# Patient Record
Sex: MALE | Race: WHITE | Employment: OTHER | URBAN - METROPOLITAN AREA
[De-identification: names, ages, dates, MRNs, and addresses within clinical notes are randomized per-mention and may not be internally consistent; named-entity substitution may affect disease eponyms.]

---

## 2017-01-31 ENCOUNTER — TELEPHONE (OUTPATIENT)
Dept: CARDIOLOGY CLINIC | Age: 52
End: 2017-01-31

## 2017-02-02 ENCOUNTER — TELEPHONE (OUTPATIENT)
Dept: CARDIOLOGY CLINIC | Age: 52
End: 2017-02-02

## 2017-02-09 ENCOUNTER — TELEPHONE (OUTPATIENT)
Dept: CARDIOLOGY CLINIC | Age: 52
End: 2017-02-09

## 2017-02-13 ENCOUNTER — TELEPHONE (OUTPATIENT)
Dept: CARDIOLOGY CLINIC | Age: 52
End: 2017-02-13

## 2017-02-24 ENCOUNTER — OFFICE VISIT (OUTPATIENT)
Dept: CARDIOLOGY CLINIC | Age: 52
End: 2017-02-24

## 2017-02-24 VITALS
HEART RATE: 87 BPM | HEIGHT: 67 IN | WEIGHT: 149.4 LBS | DIASTOLIC BLOOD PRESSURE: 80 MMHG | SYSTOLIC BLOOD PRESSURE: 106 MMHG | BODY MASS INDEX: 23.45 KG/M2

## 2017-02-24 DIAGNOSIS — I48.0 PAF (PAROXYSMAL ATRIAL FIBRILLATION) (HCC): Primary | ICD-10-CM

## 2017-02-24 PROCEDURE — 99214 OFFICE O/P EST MOD 30 MIN: CPT | Performed by: INTERNAL MEDICINE

## 2017-03-27 ENCOUNTER — TELEPHONE (OUTPATIENT)
Dept: CARDIOLOGY CLINIC | Age: 52
End: 2017-03-27

## 2017-03-28 ENCOUNTER — PROCEDURE VISIT (OUTPATIENT)
Dept: CARDIOLOGY CLINIC | Age: 52
End: 2017-03-28

## 2017-03-28 DIAGNOSIS — Z95.810 DUAL IMPLANTABLE CARDIOVERTER-DEFIBRILLATOR IN SITU: Primary | ICD-10-CM

## 2017-03-28 PROCEDURE — 93296 REM INTERROG EVL PM/IDS: CPT | Performed by: INTERNAL MEDICINE

## 2017-03-28 PROCEDURE — 93295 DEV INTERROG REMOTE 1/2/MLT: CPT | Performed by: INTERNAL MEDICINE

## 2017-03-28 PROCEDURE — 93297 REM INTERROG DEV EVAL ICPMS: CPT | Performed by: INTERNAL MEDICINE

## 2017-04-17 DIAGNOSIS — I25.5 ISCHEMIC CARDIOMYOPATHY: ICD-10-CM

## 2017-04-18 RX ORDER — FUROSEMIDE 20 MG/1
20 TABLET ORAL DAILY
Qty: 30 TABLET | Refills: 1 | Status: SHIPPED | OUTPATIENT
Start: 2017-04-18 | End: 2017-06-21 | Stop reason: SDUPTHER

## 2017-04-27 ENCOUNTER — OFFICE VISIT (OUTPATIENT)
Dept: CARDIOLOGY CLINIC | Age: 52
End: 2017-04-27

## 2017-04-27 VITALS
SYSTOLIC BLOOD PRESSURE: 120 MMHG | DIASTOLIC BLOOD PRESSURE: 68 MMHG | HEART RATE: 86 BPM | BODY MASS INDEX: 23.39 KG/M2 | HEIGHT: 67 IN | WEIGHT: 149 LBS

## 2017-04-27 DIAGNOSIS — I48.0 PAF (PAROXYSMAL ATRIAL FIBRILLATION) (HCC): Primary | ICD-10-CM

## 2017-04-27 PROCEDURE — 99214 OFFICE O/P EST MOD 30 MIN: CPT | Performed by: INTERNAL MEDICINE

## 2017-06-21 DIAGNOSIS — I25.5 ISCHEMIC CARDIOMYOPATHY: ICD-10-CM

## 2017-06-23 RX ORDER — FUROSEMIDE 20 MG/1
20 TABLET ORAL DAILY
Qty: 30 TABLET | Refills: 11 | Status: SHIPPED | OUTPATIENT
Start: 2017-06-23 | End: 2017-12-29 | Stop reason: SDUPTHER

## 2017-07-05 ENCOUNTER — PROCEDURE VISIT (OUTPATIENT)
Dept: CARDIOLOGY CLINIC | Age: 52
End: 2017-07-05

## 2017-07-05 DIAGNOSIS — Z95.810 ICD (IMPLANTABLE CARDIOVERTER-DEFIBRILLATOR), DUAL, IN SITU: Primary | ICD-10-CM

## 2017-07-05 PROCEDURE — 93297 REM INTERROG DEV EVAL ICPMS: CPT | Performed by: INTERNAL MEDICINE

## 2017-07-05 PROCEDURE — 93296 REM INTERROG EVL PM/IDS: CPT | Performed by: INTERNAL MEDICINE

## 2017-07-05 PROCEDURE — 93295 DEV INTERROG REMOTE 1/2/MLT: CPT | Performed by: INTERNAL MEDICINE

## 2017-07-25 ENCOUNTER — OFFICE VISIT (OUTPATIENT)
Dept: CARDIOLOGY CLINIC | Age: 52
End: 2017-07-25

## 2017-07-25 VITALS
BODY MASS INDEX: 23.57 KG/M2 | HEIGHT: 67 IN | DIASTOLIC BLOOD PRESSURE: 60 MMHG | SYSTOLIC BLOOD PRESSURE: 100 MMHG | WEIGHT: 150.2 LBS | HEART RATE: 73 BPM

## 2017-07-25 DIAGNOSIS — R06.02 SHORTNESS OF BREATH: Primary | ICD-10-CM

## 2017-07-25 DIAGNOSIS — Z92.29 H/O AMIODARONE THERAPY: ICD-10-CM

## 2017-07-25 PROCEDURE — 93000 ELECTROCARDIOGRAM COMPLETE: CPT | Performed by: INTERNAL MEDICINE

## 2017-07-25 PROCEDURE — 99213 OFFICE O/P EST LOW 20 MIN: CPT | Performed by: INTERNAL MEDICINE

## 2017-07-26 RX ORDER — ATORVASTATIN CALCIUM 80 MG/1
80 TABLET, FILM COATED ORAL DAILY
Qty: 30 TABLET | Refills: 11 | Status: SHIPPED | OUTPATIENT
Start: 2017-07-26 | End: 2020-08-11

## 2017-07-27 ENCOUNTER — PROCEDURE VISIT (OUTPATIENT)
Dept: CARDIOLOGY CLINIC | Age: 52
End: 2017-07-27

## 2017-07-27 DIAGNOSIS — R06.02 SHORTNESS OF BREATH: Primary | ICD-10-CM

## 2017-07-27 LAB
LV EF: 18 %
LVEF MODALITY: NORMAL

## 2017-07-27 PROCEDURE — 93306 TTE W/DOPPLER COMPLETE: CPT | Performed by: INTERNAL MEDICINE

## 2017-07-31 ENCOUNTER — TELEPHONE (OUTPATIENT)
Dept: CARDIOLOGY CLINIC | Age: 52
End: 2017-07-31

## 2017-08-28 ENCOUNTER — TELEPHONE (OUTPATIENT)
Dept: CARDIOLOGY CLINIC | Age: 52
End: 2017-08-28

## 2017-10-09 ENCOUNTER — PROCEDURE VISIT (OUTPATIENT)
Dept: CARDIOLOGY CLINIC | Age: 52
End: 2017-10-09

## 2017-10-09 DIAGNOSIS — Z95.810 ICD (IMPLANTABLE CARDIOVERTER-DEFIBRILLATOR), DUAL, IN SITU: Primary | ICD-10-CM

## 2017-10-09 PROCEDURE — 93297 REM INTERROG DEV EVAL ICPMS: CPT | Performed by: INTERNAL MEDICINE

## 2017-10-09 PROCEDURE — 93296 REM INTERROG EVL PM/IDS: CPT | Performed by: INTERNAL MEDICINE

## 2017-10-09 PROCEDURE — 93295 DEV INTERROG REMOTE 1/2/MLT: CPT | Performed by: INTERNAL MEDICINE

## 2017-10-23 ENCOUNTER — TELEPHONE (OUTPATIENT)
Dept: CARDIOLOGY CLINIC | Age: 52
End: 2017-10-23

## 2017-10-23 NOTE — TELEPHONE ENCOUNTER
Per Dr. Crow De Leon patient is fine to have dental procedure. 3 Steven Kebede left a message that patient is OK to have dental procedure.

## 2017-10-23 NOTE — TELEPHONE ENCOUNTER
[address] called and wants to know if ok to pull pt tooth, due to pt stated he has a stroke last week. Please advise.  Call to [employer].

## 2017-11-18 DIAGNOSIS — I25.5 ISCHEMIC CARDIOMYOPATHY: ICD-10-CM

## 2017-11-20 RX ORDER — CARVEDILOL 6.25 MG/1
6.25 TABLET ORAL 2 TIMES DAILY WITH MEALS
Qty: 180 TABLET | Refills: 3 | Status: SHIPPED | OUTPATIENT
Start: 2017-11-20 | End: 2019-01-10 | Stop reason: SDUPTHER

## 2017-12-18 ENCOUNTER — TELEPHONE (OUTPATIENT)
Dept: CARDIOLOGY CLINIC | Age: 52
End: 2017-12-18

## 2017-12-28 ENCOUNTER — TELEPHONE (OUTPATIENT)
Dept: CARDIOLOGY CLINIC | Age: 52
End: 2017-12-28

## 2017-12-29 ENCOUNTER — HOSPITAL ENCOUNTER (OUTPATIENT)
Dept: GENERAL RADIOLOGY | Age: 52
Discharge: HOME | End: 2017-12-29
Attending: INTERNAL MEDICINE | Admitting: INTERNAL MEDICINE

## 2017-12-29 ENCOUNTER — TELEPHONE (OUTPATIENT)
Dept: CARDIOLOGY CLINIC | Age: 52
End: 2017-12-29

## 2017-12-29 ENCOUNTER — OFFICE VISIT (OUTPATIENT)
Dept: CARDIOLOGY CLINIC | Age: 52
End: 2017-12-29

## 2017-12-29 VITALS
WEIGHT: 145.2 LBS | DIASTOLIC BLOOD PRESSURE: 80 MMHG | SYSTOLIC BLOOD PRESSURE: 124 MMHG | HEIGHT: 67 IN | HEART RATE: 78 BPM | BODY MASS INDEX: 22.79 KG/M2

## 2017-12-29 DIAGNOSIS — I47.20 SUSTAINED VT (VENTRICULAR TACHYCARDIA): Primary | ICD-10-CM

## 2017-12-29 DIAGNOSIS — I25.5 ISCHEMIC CARDIOMYOPATHY: ICD-10-CM

## 2017-12-29 LAB
ANION GAP SERPL CALCULATED.3IONS-SCNC: 11 MMOL/L (ref 4–16)
BUN BLDV-MCNC: 12 MG/DL (ref 6–23)
CALCIUM SERPL-MCNC: 9.5 MG/DL (ref 8.3–10.6)
CHLORIDE BLD-SCNC: 100 MMOL/L (ref 99–110)
CO2: 28 MMOL/L (ref 21–32)
CREAT SERPL-MCNC: 1.1 MG/DL (ref 0.9–1.3)
GFR AFRICAN AMERICAN: >60 ML/MIN/1.73M2
GFR NON-AFRICAN AMERICAN: >60 ML/MIN/1.73M2
GLUCOSE BLD-MCNC: 86 MG/DL (ref 70–99)
MAGNESIUM: 2.3 MG/DL (ref 1.8–2.4)
PHOSPHORUS: 3.2 MG/DL (ref 2.5–4.9)
POTASSIUM SERPL-SCNC: 4.5 MMOL/L (ref 3.5–5.1)
PRO-BNP: 664.8 PG/ML
SODIUM BLD-SCNC: 139 MMOL/L (ref 135–145)

## 2017-12-29 PROCEDURE — 99214 OFFICE O/P EST MOD 30 MIN: CPT | Performed by: INTERNAL MEDICINE

## 2017-12-29 RX ORDER — POTASSIUM CHLORIDE 750 MG/1
10 TABLET, EXTENDED RELEASE ORAL DAILY
Qty: 60 TABLET | Refills: 3 | Status: SHIPPED | OUTPATIENT
Start: 2017-12-29 | End: 2020-01-27

## 2017-12-29 RX ORDER — FUROSEMIDE 20 MG/1
20 TABLET ORAL 2 TIMES DAILY
Qty: 30 TABLET | Refills: 2 | Status: SHIPPED | OUTPATIENT
Start: 2017-12-29 | End: 2018-06-26 | Stop reason: SDUPTHER

## 2017-12-29 NOTE — PROGRESS NOTES
Electrophysiology FU Note          Chief complaint :  Recent shock    Referring physician:  Magnolia Londono      Primary care physician: Fara Schuster MD      History of Present Illness: This visit (12/29/2017)      Chief Complaint   Patient presents with    Atrial Fibrillation     Patient is here for Abn ICD check. Patient denies any chest pain,dizzines, edema, and palitations. Patient experiences sob with exertion. Previous visit: (2/24/2017)      Chief Complaint   Patient presents with    Atrial Fibrillation     Patient is here for Abn ICD check. Patient denies any chest pain,dizzines, edema, and palitations. Patient experiences sob with exertion. Previous visit:    Chief Complaint   Patient presents with    Cardiomyopathy     Pt is consult for Ischemic Cardiomyopathy from Dr Melyssa Goddard. Pt had a echo 09/22/16 and EF 20%. He had a stroke in march. He had MI in [facility] with stents. He has finished cardiac rehab. He has shortness of breath with exertion. He is smoking less then a pack a day. He denies chest pain, diziness, palpitation, and edema. Past medical history:   Past Medical History:   Diagnosis Date    Acute exacerbation of CHF (congestive heart failure) ([facility] [address] 9/20/2016    CAD (coronary artery disease)     Cerebral artery occlusion with cerebral infarction ([facility] [address]     Chest pain     COPD (chronic obstructive pulmonary disease) ([facility] [address]     Depression     H/O cardiovascular stress test 5/2/2016    treadmill    H/O Doppler ultrasound 10/14/2016    This  Carotid  ultrasound  is   normal    H/O echocardiogram 4/6/16    EF 25%,     H/O echocardiogram 09/22/2016    EF20%. Dilated cardiomyopathy with severely reduced LV systolic function. Minimal Aortic Stenosis.  Paced TR     H/O echocardiogram 07/27/2017    EF 15-20% Mild to mod TR    H/O heart artery stent 5/12/16    successful PTCA and PCI of RCA with 2.5x20mm balloon and then KRYSTLE

## 2018-01-10 ENCOUNTER — TELEPHONE (OUTPATIENT)
Dept: CARDIOLOGY CLINIC | Age: 53
End: 2018-01-10

## 2018-01-22 ENCOUNTER — PROCEDURE VISIT (OUTPATIENT)
Dept: CARDIOLOGY CLINIC | Age: 53
End: 2018-01-22

## 2018-01-22 DIAGNOSIS — Z95.810 ICD (IMPLANTABLE CARDIOVERTER-DEFIBRILLATOR), DUAL, IN SITU: Primary | ICD-10-CM

## 2018-01-22 PROCEDURE — 93295 DEV INTERROG REMOTE 1/2/MLT: CPT | Performed by: INTERNAL MEDICINE

## 2018-01-22 PROCEDURE — 93296 REM INTERROG EVL PM/IDS: CPT | Performed by: INTERNAL MEDICINE

## 2018-01-22 PROCEDURE — 93297 REM INTERROG DEV EVAL ICPMS: CPT | Performed by: INTERNAL MEDICINE

## 2018-01-25 ENCOUNTER — OFFICE VISIT (OUTPATIENT)
Dept: CARDIOLOGY CLINIC | Age: 53
End: 2018-01-25

## 2018-01-25 VITALS
DIASTOLIC BLOOD PRESSURE: 58 MMHG | HEIGHT: 67 IN | BODY MASS INDEX: 22.98 KG/M2 | WEIGHT: 146.4 LBS | HEART RATE: 72 BPM | SYSTOLIC BLOOD PRESSURE: 116 MMHG

## 2018-01-25 DIAGNOSIS — I25.10 CORONARY ARTERY DISEASE INVOLVING NATIVE CORONARY ARTERY OF NATIVE HEART WITHOUT ANGINA PECTORIS: Primary | ICD-10-CM

## 2018-01-25 PROCEDURE — 99214 OFFICE O/P EST MOD 30 MIN: CPT | Performed by: INTERNAL MEDICINE

## 2018-01-25 NOTE — PROGRESS NOTES
daily    LTB#[id number]  Exp 9/17  3 bottles x 14=42 42 tablet 0    amiodarone (CORDARONE) 200 MG tablet Take 1 tablet by mouth daily 30 tablet 3    spironolactone (ALDACTONE) 25 MG tablet Take 0.5 tablets by mouth 2 times daily 30 tablet 0    clopidogrel (PLAVIX) 75 MG tablet Take 1 tablet by mouth daily 30 tablet 6    folic acid-pyridoxine-cyancobalamin (FOLTX) 1.13-25-2 MG TABS Take 1 tablet by mouth daily 30 tablet 6    amLODIPine-benazepril (LOTREL) 5-20 MG per capsule 1 capsule daily       aspirin 81 MG chewable tablet Take 1 tablet by mouth daily 30 tablet 3    Multiple Vitamins-Minerals (THERAPEUTIC MULTIVITAMIN-MINERALS) tablet Take 1 tablet by mouth daily      PARoxetine (PAXIL) 30 MG tablet Take 30 mg by mouth every morning       No current facility-administered medications for this visit. Allergies: Review of patient's allergies indicates no known allergies. Past Medical History:   Diagnosis Date    Acute exacerbation of CHF (congestive heart failure) ([facility] [address] 9/20/2016    CAD (coronary artery disease)     Cerebral artery occlusion with cerebral infarction ([facility] [address]     Chest pain     COPD (chronic obstructive pulmonary disease) ([facility] [address]     Depression     H/O cardiovascular stress test 5/2/2016    treadmill    H/O Doppler ultrasound 10/14/2016    This  Carotid  ultrasound  is   normal    H/O echocardiogram 4/6/16    EF 25%,     H/O echocardiogram 09/22/2016    EF20%. Dilated cardiomyopathy with severely reduced LV systolic function. Minimal Aortic Stenosis.  Paced TR     H/O echocardiogram 07/27/2017    EF 15-20% Mild to mod TR    H/O heart artery stent 5/12/16    successful PTCA and PCI of RCA with 2.5x20mm balloon and then KRYSTLE 2.5X38MM stent     H/O percutaneous left heart catheterization 5/12/16    right coronary artery is a dominant vessel with 85% long proximal to mid segment stenosis    Hyperlipidemia     Hypertension     PAF (paroxysmal atrial fibrillation) (HCC)     Post PTCA kg)   BMI 22.93 kg/m²   Wt Readings from Last 3 Encounters:   01/25/18 146 lb 6.4 oz (66.4 kg)   12/29/17 145 lb 3.2 oz (65.9 kg)   09/15/17 145 lb (65.8 kg)     Body mass index is 22.93 kg/m². GENERAL - Alert, oriented, pleasant, in no apparent distress,normal grooming  HEENT  pupils are reactive to light and accomodation, cornea intact, conjunctive normal color, ears are normal in exam,throat exam in normal, teeth, gum and palate are normal, oral mucosa is normal without any notation of pallor or cyanosis  Neck - Supple. No jugular venous distention noted. No carotid bruits, no apical -carotid delay  Respiratory:  Normal breath sounds, No respiratory distress, No wheezing, No chest tenderness. ,no use of accessory muscles, diaphragm movement is normal  Cardiovascular: (PMI) apex non displaced,no lifts no thrills, no s3,no s4, Normal heart rate, Normal rhythm, No murmurs, No rubs, No gallops. Carotid arteries pulse and amplitude are normal no bruit, no abdominal bruit noted ( normal abdominal aorta ausculation), femoral arteries pulse and amplitude are normal no bruit, pedal pulses are normal  Femoral pulses have normal amplitude, no bruits   Extremities - No cyanosis, clubbing, or significant edema, no varicose veins    Abdomen  No masses, tenderness, or organomegaly, no hepato-splenomegally, no bruits  Musculoskeletal  No significant edema, no kyphosis or scoliosis, no deformity in any extremity noted, muscle strength and tone are normal  Skin: no ulcer,no scar,no stasis dermatitis   Neurologic  alert oriented times 3,Cranial nerves II through XII are grossly intact. There were no gross focal neurologic abnormalities.  All sensory and motor nerves examined and were normal  Psychiatric: normal mood and affect    Lab Results   Component Value Date    CKTOTAL 132 09/15/2017     BNP:  No results found for: BNP  PT/INR:  No results found for: Vir2us  Lab Results   Component Value Date    LABA1C 6.0 03/09/2016

## 2018-02-19 ENCOUNTER — TELEPHONE (OUTPATIENT)
Dept: CARDIOLOGY CLINIC | Age: 53
End: 2018-02-19

## 2018-04-23 ENCOUNTER — OFFICE VISIT (OUTPATIENT)
Dept: CARDIOLOGY CLINIC | Age: 53
End: 2018-04-23

## 2018-04-23 ENCOUNTER — TELEPHONE (OUTPATIENT)
Dept: CARDIOLOGY CLINIC | Age: 53
End: 2018-04-23

## 2018-04-23 VITALS
HEART RATE: 83 BPM | SYSTOLIC BLOOD PRESSURE: 108 MMHG | BODY MASS INDEX: 22.85 KG/M2 | WEIGHT: 145.6 LBS | HEIGHT: 67 IN | DIASTOLIC BLOOD PRESSURE: 76 MMHG

## 2018-04-23 DIAGNOSIS — Z79.899 MEDICATION THERAPY CONTINUED: Primary | ICD-10-CM

## 2018-04-23 PROCEDURE — 93000 ELECTROCARDIOGRAM COMPLETE: CPT | Performed by: INTERNAL MEDICINE

## 2018-04-23 PROCEDURE — 99214 OFFICE O/P EST MOD 30 MIN: CPT | Performed by: INTERNAL MEDICINE

## 2018-04-30 ENCOUNTER — PROCEDURE VISIT (OUTPATIENT)
Dept: CARDIOLOGY CLINIC | Age: 53
End: 2018-04-30

## 2018-04-30 DIAGNOSIS — Z95.810 ICD (IMPLANTABLE CARDIOVERTER-DEFIBRILLATOR), DUAL, IN SITU: Primary | ICD-10-CM

## 2018-04-30 PROCEDURE — 93297 REM INTERROG DEV EVAL ICPMS: CPT | Performed by: INTERNAL MEDICINE

## 2018-04-30 PROCEDURE — 93296 REM INTERROG EVL PM/IDS: CPT | Performed by: INTERNAL MEDICINE

## 2018-04-30 PROCEDURE — 93295 DEV INTERROG REMOTE 1/2/MLT: CPT | Performed by: INTERNAL MEDICINE

## 2018-05-21 ENCOUNTER — TELEPHONE (OUTPATIENT)
Dept: CARDIOLOGY CLINIC | Age: 53
End: 2018-05-21

## 2018-05-22 ENCOUNTER — TELEPHONE (OUTPATIENT)
Dept: CARDIOLOGY CLINIC | Age: 53
End: 2018-05-22

## 2018-06-05 ENCOUNTER — TELEPHONE (OUTPATIENT)
Dept: CARDIOLOGY CLINIC | Age: 53
End: 2018-06-05

## 2018-06-26 ENCOUNTER — TELEPHONE (OUTPATIENT)
Dept: CARDIOLOGY CLINIC | Age: 53
End: 2018-06-26

## 2018-06-26 DIAGNOSIS — I25.5 ISCHEMIC CARDIOMYOPATHY: ICD-10-CM

## 2018-06-26 RX ORDER — FUROSEMIDE 20 MG/1
20 TABLET ORAL 2 TIMES DAILY
Qty: 30 TABLET | Refills: 0 | Status: SHIPPED | OUTPATIENT
Start: 2018-06-26 | End: 2018-07-20 | Stop reason: SDUPTHER

## 2018-07-20 ENCOUNTER — OFFICE VISIT (OUTPATIENT)
Dept: CARDIOLOGY CLINIC | Age: 53
End: 2018-07-20

## 2018-07-20 VITALS
HEART RATE: 64 BPM | DIASTOLIC BLOOD PRESSURE: 88 MMHG | SYSTOLIC BLOOD PRESSURE: 112 MMHG | HEIGHT: 67 IN | WEIGHT: 145.2 LBS | BODY MASS INDEX: 22.79 KG/M2

## 2018-07-20 DIAGNOSIS — Z45.02 ICD (IMPLANTABLE CARDIOVERTER-DEFIBRILLATOR) BATTERY DEPLETION: ICD-10-CM

## 2018-07-20 DIAGNOSIS — I47.29 NSVT (NONSUSTAINED VENTRICULAR TACHYCARDIA): Primary | ICD-10-CM

## 2018-07-20 DIAGNOSIS — I25.5 ISCHEMIC CARDIOMYOPATHY: ICD-10-CM

## 2018-07-20 PROCEDURE — G8420 CALC BMI NORM PARAMETERS: HCPCS | Performed by: INTERNAL MEDICINE

## 2018-07-20 PROCEDURE — 4004F PT TOBACCO SCREEN RCVD TLK: CPT | Performed by: INTERNAL MEDICINE

## 2018-07-20 PROCEDURE — 99212 OFFICE O/P EST SF 10 MIN: CPT | Performed by: INTERNAL MEDICINE

## 2018-07-20 PROCEDURE — 3017F COLORECTAL CA SCREEN DOC REV: CPT | Performed by: INTERNAL MEDICINE

## 2018-07-20 PROCEDURE — G8427 DOCREV CUR MEDS BY ELIG CLIN: HCPCS | Performed by: INTERNAL MEDICINE

## 2018-07-20 PROCEDURE — G8598 ASA/ANTIPLAT THER USED: HCPCS | Performed by: INTERNAL MEDICINE

## 2018-07-20 RX ORDER — FUROSEMIDE 20 MG/1
20 TABLET ORAL 2 TIMES DAILY
Qty: 30 TABLET | Refills: 3 | Status: SHIPPED | OUTPATIENT
Start: 2018-07-20 | End: 2019-06-14 | Stop reason: SDUPTHER

## 2018-07-20 NOTE — PROGRESS NOTES
 PAF (paroxysmal atrial fibrillation) ([facility])     Post PTCA 4/6/16    EF 25%, Stented: LAD &Ostial DX , RCA has 80% Mid segment stenosis, LG anterior wall aneurysm     STEMI (ST elevation myocardial infarction) ([facility] [address] 4/6/16    Unstable angina ([facility])     Vertigo        Surgical history :   Past Surgical History:   Procedure Laterality Date    CARDIAC CATHETERIZATION  5/12/16    right coronary artery is a dominant vessel with 85% long proximal to mid segment stenosis    CARDIAC DEFIBRILLATOR PLACEMENT Left 11/22/2016    CORONARY ANGIOPLASTY WITH STENT PLACEMENT  5/12/16    successful PTCA and PCI of RCA with 2.5x20mm balloon and then KRYSTLE 2.5X38MM stent        Family history:   Family History   Problem Relation Age of Onset    Kidney Disease Father     High Blood Pressure Father        Social history :  reports that he has been smoking Cigarettes. He has a 20.00 pack-year smoking history. He has never used smokeless tobacco. He reports that he uses drugs, including Marijuana, about 7 times per week. He reports that he does not drink alcohol.     No Known Allergies    Current Outpatient Prescriptions on File Prior to Visit   Medication Sig Dispense Refill    furosemide (LASIX) 20 MG tablet Take 1 tablet by mouth 2 times daily 30 tablet 0    rivaroxaban (XARELTO) 20 MG TABS tablet Take 1 tablet by mouth daily (with breakfast) 30 tablet 11    potassium chloride (KLOR-CON M) 10 MEQ extended release tablet Take 1 tablet by mouth daily 60 tablet 3    carvedilol (COREG) 6.25 MG tablet Take 1 tablet by mouth 2 times daily (with meals) 180 tablet 3    atorvastatin (LIPITOR) 80 MG tablet Take 1 tablet by mouth daily 30 tablet 11    amLODIPine-benazepril (LOTREL) 5-20 MG per capsule 1 capsule daily       aspirin 81 MG chewable tablet Take 1 tablet by mouth daily 30 tablet 3    PARoxetine (PAXIL) 30 MG tablet Take 30 mg by mouth every morning       No current facility-administered medications on file prior to visit. Review of Systems:   Review of Systems   Constitutional: feels better. Negative for chills and fever. HENT: Negative for congestion, ear pain and tinnitus. Eyes: Negative for photophobia, pain and visual disturbance. Respiratory: Positive for shortness of breath only with moderate exertion. Negative for cough, chest tightness and wheezing. Cardiovascular: Negative for chest pain, palpitations and leg swelling. Gastrointestinal: Negative for abdominal pain, blood in stool, constipation, diarrhea, nausea and vomiting. Endocrine: Negative for cold intolerance and heat intolerance. Genitourinary: Negative for dysuria, flank pain and hematuria. Musculoskeletal: Positive for arthralgias. Negative for back pain, myalgias and neck stiffness. Skin: Negative for color change and rash. Allergic/Immunologic: Negative for food allergies. Neurological: Negative for dizziness, light-headedness, numbness and headaches. Hematological: Does not bruise/bleed easily. Psychiatric/Behavioral: Negative for agitation and confusion. Physical Examination:    /88   Pulse 64   Ht 5' 7\" (1.702 m)   Wt 145 lb 3.2 oz (65.9 kg)   BMI 22.74 kg/m²    Wt Readings from Last 3 Encounters:   07/20/18 145 lb 3.2 oz (65.9 kg)   04/23/18 145 lb 9.6 oz (66 kg)   01/25/18 146 lb 6.4 oz (66.4 kg)     Body mass index is 22.74 kg/m². Physical Exam   Constitutional: He is oriented to person, place, and time and well-developed, well-nourished, and in no distress. HENT:   Head: Normocephalic and atraumatic. Eyes: Conjunctivae and EOM are normal. Pupils are equal, round, and reactive to light. Right eye exhibits no discharge. Neck: Normal range of motion. No JVD present. No thyromegaly present. Cardiovascular: Normal rate and regular rhythm. Exam reveals no friction rub. Murmur heard. Pulmonary/Chest: basilar rales. No wheezes. Abdominal: Soft.  Bowel sounds are normal. He exhibits no distension. There is no tenderness. Musculoskeletal: Normal range of motion. He exhibits no edema or tenderness. Neurological: He is alert and oriented to person, place, and time. No cranial nerve deficit. Skin: Skin is warm and dry. No rash noted. No erythema. Psychiatric: Mood and affect normal.         CBC:   Lab Results   Component Value Date    WBC 9.7 09/15/2017    HGB 15.0 09/15/2017    HCT 44.4 09/15/2017     09/15/2017     Lipids:   Lab Results   Component Value Date    CHOL 165 08/31/2016    TRIG 83 08/31/2016    HDL 36 (L) 08/31/2016    LDLDIRECT 116 (H) 08/31/2016     PT/INR:   Lab Results   Component Value Date    INR 1.02 09/15/2017        BMP:    Lab Results   Component Value Date     12/29/2017    K 4.5 12/29/2017     12/29/2017    CO2 28 12/29/2017    BUN 12 12/29/2017     CMP:   Lab Results   Component Value Date    AST 19 09/15/2017    PROT 7.7 09/15/2017    BILITOT 0.4 09/15/2017    ALKPHOS 205 (H) 09/15/2017     TSH:  No results found for: TSH    EKGINTERPRETATION - EKG Interpretation:   Sinus rhythm, old anterior MI      IMPRESSION / RECOMMENDATIONS:         1. Non sustained VT  2. Ischemic cardiomyopathy  3. Sp ICD  4. History of MI  5. HLD  6. PAF history    Patient device interrogated  Device VT therapy zone decreased to 171 bpm as patient had 179 bpm NSVT to avoid missing therapies  Patient had no atrial fibrillation  Other parameters are with in normal limits  Optivol increased recently but back down now.   FU with primary cardiologist with Adama Chung MD

## 2018-08-06 ENCOUNTER — TELEPHONE (OUTPATIENT)
Dept: CARDIOLOGY CLINIC | Age: 53
End: 2018-08-06

## 2018-08-06 ENCOUNTER — PROCEDURE VISIT (OUTPATIENT)
Dept: CARDIOLOGY CLINIC | Age: 53
End: 2018-08-06

## 2018-08-06 DIAGNOSIS — Z95.810 ICD (IMPLANTABLE CARDIOVERTER-DEFIBRILLATOR), DUAL, IN SITU: Primary | ICD-10-CM

## 2018-08-06 PROCEDURE — 93296 REM INTERROG EVL PM/IDS: CPT | Performed by: INTERNAL MEDICINE

## 2018-08-06 PROCEDURE — 93295 DEV INTERROG REMOTE 1/2/MLT: CPT | Performed by: INTERNAL MEDICINE

## 2018-08-06 PROCEDURE — 93297 REM INTERROG DEV EVAL ICPMS: CPT | Performed by: INTERNAL MEDICINE

## 2018-11-09 DIAGNOSIS — E78.5 HYPERLIPIDEMIA, UNSPECIFIED HYPERLIPIDEMIA TYPE: Primary | ICD-10-CM

## 2018-11-12 ENCOUNTER — TELEPHONE (OUTPATIENT)
Dept: CARDIOLOGY CLINIC | Age: 53
End: 2018-11-12

## 2018-11-12 ENCOUNTER — PROCEDURE VISIT (OUTPATIENT)
Dept: CARDIOLOGY CLINIC | Age: 53
End: 2018-11-12
Payer: COMMERCIAL

## 2018-11-12 DIAGNOSIS — Z95.810 ICD (IMPLANTABLE CARDIOVERTER-DEFIBRILLATOR), DUAL, IN SITU: Primary | ICD-10-CM

## 2018-11-12 DIAGNOSIS — I44.0 AV BLOCK, 1ST DEGREE: ICD-10-CM

## 2018-11-12 DIAGNOSIS — I49.5 SINUS NODE DYSFUNCTION (HCC): ICD-10-CM

## 2018-11-12 PROCEDURE — 93297 REM INTERROG DEV EVAL ICPMS: CPT | Performed by: INTERNAL MEDICINE

## 2018-11-12 PROCEDURE — 93295 DEV INTERROG REMOTE 1/2/MLT: CPT | Performed by: INTERNAL MEDICINE

## 2018-11-12 PROCEDURE — 93296 REM INTERROG EVL PM/IDS: CPT | Performed by: INTERNAL MEDICINE

## 2018-11-12 RX ORDER — ATORVASTATIN CALCIUM 80 MG/1
80 TABLET, FILM COATED ORAL DAILY
Qty: 30 TABLET | Refills: 11 | OUTPATIENT
Start: 2018-11-12

## 2018-11-12 NOTE — TELEPHONE ENCOUNTER
Called and reminded patient to send carelink transmission. He said that he would have done that but he never did get a schedule. I told patient that I would put him a new schedule in the mail today.

## 2018-11-12 NOTE — LETTER
Cardiology [address] [place] [facility]  Phone: [phone]  Fax: [phone]    11/12/2018        [place] [facility] [address]            Dear Bob Abdi: This is your Carelink schedule. You can jordi your calendar with these dates. Remember that your device is wireless and should automatically do these checks while you are sleeping. If for any reason I do not get your transmission then I will call you and ask that you send a manual transmission. If you have any questions or concerns, please call and ask for Bipin Renee at [phone]. Thank you.

## 2018-11-16 ENCOUNTER — TELEPHONE (OUTPATIENT)
Dept: CARDIOLOGY CLINIC | Age: 53
End: 2018-11-16

## 2018-11-16 NOTE — TELEPHONE ENCOUNTER
Per Dr Annel Campbell impression requesting patient schedule a follow up with Dr Nannette Brewer (please see carelink report). Attempted to reach patient to schedule a follow up. Spoke with patient and he states he does not have medical insurance coverage at this time. He says he has been feeling fine, denies any symptoms and can not afford an office visit at this time. Patient states he will contact the office or seek immediate treatment if symptoms appear or if insurance situation changes.

## 2019-01-10 DIAGNOSIS — I25.5 ISCHEMIC CARDIOMYOPATHY: ICD-10-CM

## 2019-01-10 RX ORDER — CARVEDILOL 6.25 MG/1
6.25 TABLET ORAL 2 TIMES DAILY WITH MEALS
Qty: 180 TABLET | Refills: 0 | Status: SHIPPED | OUTPATIENT
Start: 2019-01-10 | End: 2019-06-14 | Stop reason: SDUPTHER

## 2019-02-21 ENCOUNTER — PROCEDURE VISIT (OUTPATIENT)
Dept: CARDIOLOGY CLINIC | Age: 54
End: 2019-02-21
Payer: COMMERCIAL

## 2019-02-21 DIAGNOSIS — Z95.810 ICD (IMPLANTABLE CARDIOVERTER-DEFIBRILLATOR), DUAL, IN SITU: Primary | ICD-10-CM

## 2019-02-21 PROCEDURE — 93295 DEV INTERROG REMOTE 1/2/MLT: CPT | Performed by: INTERNAL MEDICINE

## 2019-02-21 PROCEDURE — 93296 REM INTERROG EVL PM/IDS: CPT | Performed by: INTERNAL MEDICINE

## 2019-02-21 PROCEDURE — 93297 REM INTERROG DEV EVAL ICPMS: CPT | Performed by: INTERNAL MEDICINE

## 2019-05-29 ENCOUNTER — PROCEDURE VISIT (OUTPATIENT)
Dept: CARDIOLOGY CLINIC | Age: 54
End: 2019-05-29
Payer: COMMERCIAL

## 2019-05-29 DIAGNOSIS — Z95.810 ICD (IMPLANTABLE CARDIOVERTER-DEFIBRILLATOR), DUAL, IN SITU: Primary | ICD-10-CM

## 2019-05-29 PROCEDURE — 93295 DEV INTERROG REMOTE 1/2/MLT: CPT | Performed by: INTERNAL MEDICINE

## 2019-05-29 PROCEDURE — 93296 REM INTERROG EVL PM/IDS: CPT | Performed by: INTERNAL MEDICINE

## 2019-05-29 PROCEDURE — 93297 REM INTERROG DEV EVAL ICPMS: CPT | Performed by: INTERNAL MEDICINE

## 2019-06-13 DIAGNOSIS — I25.5 ISCHEMIC CARDIOMYOPATHY: Primary | ICD-10-CM

## 2019-06-14 RX ORDER — CARVEDILOL 6.25 MG/1
6.25 TABLET ORAL 2 TIMES DAILY WITH MEALS
Qty: 60 TABLET | Refills: 0 | Status: SHIPPED | OUTPATIENT
Start: 2019-06-14 | End: 2019-07-24 | Stop reason: SDUPTHER

## 2019-06-14 RX ORDER — FUROSEMIDE 20 MG/1
20 TABLET ORAL 2 TIMES DAILY
Qty: 60 TABLET | Refills: 0 | Status: SHIPPED | OUTPATIENT
Start: 2019-06-14 | End: 2019-07-24 | Stop reason: SDUPTHER

## 2019-06-21 ENCOUNTER — HOSPITAL ENCOUNTER (OUTPATIENT)
Age: 54
Discharge: HOME | End: 2019-06-21

## 2019-06-21 DIAGNOSIS — I25.5 ISCHEMIC CARDIOMYOPATHY: ICD-10-CM

## 2019-06-21 LAB
ANION GAP SERPL CALCULATED.3IONS-SCNC: 11 MMOL/L (ref 4–16)
BUN BLDV-MCNC: 15 MG/DL (ref 6–23)
CALCIUM SERPL-MCNC: 9.2 MG/DL (ref 8.3–10.6)
CHLORIDE BLD-SCNC: 102 MMOL/L (ref 99–110)
CO2: 25 MMOL/L (ref 21–32)
CREAT SERPL-MCNC: 1.2 MG/DL (ref 0.9–1.3)
GFR AFRICAN AMERICAN: >60 ML/MIN/1.73M2
GFR NON-AFRICAN AMERICAN: >60 ML/MIN/1.73M2
GLUCOSE BLD-MCNC: 83 MG/DL (ref 70–99)
POTASSIUM SERPL-SCNC: 4.8 MMOL/L (ref 3.5–5.1)
SODIUM BLD-SCNC: 138 MMOL/L (ref 135–145)

## 2019-06-21 PROCEDURE — 36415 COLL VENOUS BLD VENIPUNCTURE: CPT

## 2019-06-21 PROCEDURE — 80048 BASIC METABOLIC PNL TOTAL CA: CPT

## 2019-07-03 ENCOUNTER — OFFICE VISIT (OUTPATIENT)
Dept: CARDIOLOGY CLINIC | Age: 54
End: 2019-07-03

## 2019-07-03 VITALS
DIASTOLIC BLOOD PRESSURE: 78 MMHG | OXYGEN SATURATION: 96 % | SYSTOLIC BLOOD PRESSURE: 110 MMHG | HEIGHT: 67 IN | HEART RATE: 89 BPM | BODY MASS INDEX: 23.29 KG/M2 | WEIGHT: 148.4 LBS

## 2019-07-03 DIAGNOSIS — I48.0 PAF (PAROXYSMAL ATRIAL FIBRILLATION) (HCC): Primary | ICD-10-CM

## 2019-07-03 PROCEDURE — 99214 OFFICE O/P EST MOD 30 MIN: CPT | Performed by: INTERNAL MEDICINE

## 2019-07-03 NOTE — PROGRESS NOTES
No current facility-administered medications for this visit. Allergies: Patient has no known allergies. Past Medical History:   Diagnosis Date    Acute exacerbation of CHF (congestive heart failure) ([facility] [address] 9/20/2016    CAD (coronary artery disease)     Cerebral artery occlusion with cerebral infarction ([facility] [address]     Chest pain     COPD (chronic obstructive pulmonary disease) ([address]     Depression     H/O cardiovascular stress test 5/2/2016    treadmill    H/O Doppler ultrasound 10/14/2016    This  Carotid  ultrasound  is   normal    H/O echocardiogram 4/6/16    EF 25%,     H/O echocardiogram 09/22/2016    EF20%. Dilated cardiomyopathy with severely reduced LV systolic function. Minimal Aortic Stenosis.  Paced TR     H/O echocardiogram 07/27/2017    EF 15-20% Mild to mod TR    H/O heart artery stent 5/12/16    successful PTCA and PCI of RCA with 2.5x20mm balloon and then KRYSTLE 2.5X38MM stent     H/O percutaneous left heart catheterization 5/12/16    right coronary artery is a dominant vessel with 85% long proximal to mid segment stenosis    Hyperlipidemia     Hypertension     PAF (paroxysmal atrial fibrillation) ([facility] [address]     Post PTCA 4/6/16    EF 25%, Stented: LAD &Ostial DX , RCA has 80% Mid segment stenosis, LG anterior wall aneurysm     STEMI (ST elevation myocardial infarction) ([facility] [address] 4/6/16    Unstable angina (HCC)     Vertigo      Past Surgical History:   Procedure Laterality Date    CARDIAC CATHETERIZATION  5/12/16    right coronary artery is a dominant vessel with 85% long proximal to mid segment stenosis    CARDIAC DEFIBRILLATOR PLACEMENT Left 11/22/2016    CORONARY ANGIOPLASTY WITH STENT PLACEMENT  5/12/16    successful PTCA and PCI of RCA with 2.5x20mm balloon and then KRYSTLE 2.5X38MM stent      Family History   Problem Relation Age of Onset    Kidney Disease Father     High Blood Pressure Father      Social History     Tobacco Use    Smoking status: Current Every Day Smoker

## 2019-07-24 ENCOUNTER — OFFICE VISIT (OUTPATIENT)
Dept: CARDIOLOGY CLINIC | Age: 54
End: 2019-07-24

## 2019-07-24 VITALS
WEIGHT: 145 LBS | BODY MASS INDEX: 22.76 KG/M2 | SYSTOLIC BLOOD PRESSURE: 102 MMHG | DIASTOLIC BLOOD PRESSURE: 74 MMHG | HEIGHT: 67 IN | RESPIRATION RATE: 14 BRPM | HEART RATE: 91 BPM | OXYGEN SATURATION: 96 %

## 2019-07-24 DIAGNOSIS — I25.5 ISCHEMIC CARDIOMYOPATHY: Primary | ICD-10-CM

## 2019-07-24 PROCEDURE — 99212 OFFICE O/P EST SF 10 MIN: CPT | Performed by: INTERNAL MEDICINE

## 2019-07-24 RX ORDER — CARVEDILOL 6.25 MG/1
6.25 TABLET ORAL 2 TIMES DAILY WITH MEALS
Qty: 60 TABLET | Refills: 0 | Status: SHIPPED | OUTPATIENT
Start: 2019-07-24 | End: 2019-10-02 | Stop reason: SDUPTHER

## 2019-07-24 RX ORDER — FUROSEMIDE 20 MG/1
20 TABLET ORAL 2 TIMES DAILY
Qty: 60 TABLET | Refills: 0 | Status: SHIPPED | OUTPATIENT
Start: 2019-07-24 | End: 2019-10-02 | Stop reason: SDUPTHER

## 2019-07-24 NOTE — PROGRESS NOTES
Electrophysiology FU Note          Chief complaint :  Discuss amiodarone and xarelto    Referring physician:  Braulio Cabrales      Primary care physician: Maranda Perdomo MD      History of Present Illness: This visit (7/24/2019)      Chief Complaint   Patient presents with    Follow-up     Patient here for follow up and to discuss Amiodarone/Xarelto. Patient denies any new complaints  No chest pain, shortness of breath, palpitations, dizziness. Patient did not want his device interrogated  He is self pay and he did not understand how the reading for May came by  Patient is also not taking xarelto also because he cannot afford it          Previous visit:(7/20/2018)      Chief Complaint   Patient presents with    Atrial Fibrillation     Pt is here because he needs VT rate decreased on ICD. Denies chest pain, shortness of breath  Denies syncope or palpitations  Some shortness of breath with exertion      Previous visit:    Chief Complaint   Patient presents with    Cardiomyopathy     Pt is consult for Ischemic Cardiomyopathy from Dr David Soni. Pt had a echo 09/22/16 and EF 20%. He had a stroke in march. He had MI in [facility] with stents. He has finished cardiac rehab. He has shortness of breath with exertion. He is smoking less then a pack a day. He denies chest pain, diziness, palpitation, and edema. Past medical history:   Past Medical History:   Diagnosis Date    Acute exacerbation of CHF (congestive heart failure) ([facility] [address] 9/20/2016    CAD (coronary artery disease)     Cerebral artery occlusion with cerebral infarction ([facility] [address]     Chest pain     COPD (chronic obstructive pulmonary disease) ([facility] [address]     Depression     H/O cardiovascular stress test 5/2/2016    treadmill    H/O Doppler ultrasound 10/14/2016    This  Carotid  ultrasound  is   normal    H/O echocardiogram 4/6/16    EF 25%,     H/O echocardiogram 09/22/2016    EF20%.  Dilated cardiomyopathy with Constitutional: He is oriented to person, place, and time and well-developed, well-nourished, and in no distress. HENT:   Head: Normocephalic and atraumatic. Eyes: Conjunctivae and EOM are normal. Pupils are equal, round, and reactive to light. Right eye exhibits no discharge. Neck: Normal range of motion. No JVD present. No thyromegaly present. Cardiovascular: Normal rate and regular rhythm. Exam reveals no friction rub. Murmur heard. Pulmonary/Chest: basilar rales. No wheezes. Abdominal: Soft. Bowel sounds are normal. He exhibits no distension. There is no tenderness. Musculoskeletal: Normal range of motion. He exhibits no edema or tenderness. Neurological: He is alert and oriented to person, place, and time. No cranial nerve deficit. Skin: Skin is warm and dry. No rash noted. No erythema. Psychiatric: Mood and affect normal.         CBC:   Lab Results   Component Value Date    WBC 9.7 09/15/2017    HGB 15.0 09/15/2017    HCT 44.4 09/15/2017     09/15/2017     Lipids:   Lab Results   Component Value Date    CHOL 165 08/31/2016    TRIG 83 08/31/2016    HDL 36 (L) 08/31/2016    LDLDIRECT 116 (H) 08/31/2016     PT/INR:   Lab Results   Component Value Date    INR 1.02 09/15/2017        BMP:    Lab Results   Component Value Date     06/21/2019    K 4.8 06/21/2019     06/21/2019    CO2 25 06/21/2019    BUN 15 06/21/2019     CMP:   Lab Results   Component Value Date    AST 19 09/15/2017    PROT 7.7 09/15/2017    BILITOT 0.4 09/15/2017    ALKPHOS 205 (H) 09/15/2017     TSH:  No results found for: TSH    EKGINTERPRETATION - EKG Interpretation:   Sinus rhythm, old anterior MI      IMPRESSION / RECOMMENDATIONS:         1. Non sustained VT  2. Ischemic cardiomyopathy  3. Sp ICD  4. History of MI  5. HLD  6. PAF history  7.  History of stroke too    Patient did not want his device interrogated  He is self pay and he did not understand how the reading for May came by  Patient is also not taking xarelto also because he cannot afford it  Patient understands risk of stroke  Patient is going to get his insurance back in September  Continue coreg  No arrhythmia noted on the device in May    Patient on coreg  Not on amiodarone      Kumar Concepcion MD

## 2019-10-02 DIAGNOSIS — I25.5 ISCHEMIC CARDIOMYOPATHY: ICD-10-CM

## 2019-10-03 RX ORDER — FUROSEMIDE 20 MG/1
20 TABLET ORAL 2 TIMES DAILY
Qty: 60 TABLET | Refills: 3 | Status: SHIPPED | OUTPATIENT
Start: 2019-10-03 | End: 2020-05-27

## 2019-10-03 RX ORDER — CARVEDILOL 6.25 MG/1
6.25 TABLET ORAL 2 TIMES DAILY WITH MEALS
Qty: 60 TABLET | Refills: 3 | Status: SHIPPED | OUTPATIENT
Start: 2019-10-03 | End: 2020-05-27

## 2020-01-27 ENCOUNTER — OFFICE VISIT (OUTPATIENT)
Dept: CARDIOLOGY CLINIC | Age: 55
End: 2020-01-27

## 2020-01-27 VITALS
WEIGHT: 136.6 LBS | BODY MASS INDEX: 21.44 KG/M2 | DIASTOLIC BLOOD PRESSURE: 70 MMHG | HEART RATE: 64 BPM | SYSTOLIC BLOOD PRESSURE: 112 MMHG | HEIGHT: 67 IN

## 2020-01-27 PROCEDURE — 99214 OFFICE O/P EST MOD 30 MIN: CPT | Performed by: INTERNAL MEDICINE

## 2020-01-27 NOTE — PROGRESS NOTES
displaced,no lifts no thrills, no s3,no s4, Normal heart rate, Normal rhythm, No murmurs, No rubs, No gallops. Carotid arteries pulse and amplitude are normal no bruit, no abdominal bruit noted ( normal abdominal aorta ausculation), femoral arteries pulse and amplitude are normal no bruit, pedal pulses are normal  Femoral pulses have normal amplitude, no bruits   Extremities - No cyanosis, clubbing, or significant edema, no varicose veins    Abdomen - No masses, tenderness, or organomegaly, no hepato-splenomegally, no bruits  Musculoskeletal - No significant edema, no kyphosis or scoliosis, no deformity in any extremity noted, muscle strength and tone are normal  Skin: no ulcer,no scar,no stasis dermatitis   Neurologic - alert oriented times 3,Cranial nerves II through XII are grossly intact. There were no gross focal neurologic abnormalities. All sensory and motor nerves examined and were normal  Psychiatric: normal mood and affect    Lab Results   Component Value Date    CKTOTAL 132 09/15/2017     BNP:  No results found for: BNP  PT/INR:  No results found for: PTINR  Lab Results   Component Value Date    LABA1C 6.0 03/09/2016     Lab Results   Component Value Date    CHOL 165 08/31/2016    TRIG 83 08/31/2016    HDL 36 (L) 08/31/2016    LDLDIRECT 116 (H) 08/31/2016     Lab Results   Component Value Date    ALT 8 (L) 09/15/2017    AST 19 09/15/2017     TSH:  No results found for: TSH    Impression:  Kathy Salinas is a 62 y. o.year old who  has a past medical history of Acute exacerbation of CHF (congestive heart failure) ([facility] [address] CAD (coronary artery disease), Cerebral artery occlusion with cerebral infarction [facility]), Chest pain, COPD (chronic obstructive pulmonary disease) ([facility] [address] Depression, H/O cardiovascular stress test, H/O Doppler ultrasound, H/O echocardiogram, H/O echocardiogram, H/O echocardiogram, H/O heart artery stent, H/O percutaneous left heart catheterization, Hyperlipidemia, Hypertension, PAF (paroxysmal atrial fibrillation) ([facility] [address] Post PTCA, STEMI (ST elevation myocardial infarction) ([facility] [address] Unstable angina ([facility] [address] and Vertigo. and presents with     Plan:  1. CAD: Continue aspirin,continue statins, betablockers  2. CARDIOMYOPATHY: s/p ICD, CONTINUE Lasix,KCL and patient stopped aldactone, stopped ivabradine also  3. VTACH/VFIB: seen by Yola Bennett, patient stopped amiodarone  4. PAF:  patient stopped xarelto,seen jose f Velásquez for decision for anticoagulation and did not start on coumadin, he does not have insurance,continue aspirin patient stopped amiodaone,  5. Dyslipidemia: continue statin  6. HTN: stable, continue coreg lotrel medicatons  7. Recommend to quit smoking  8. All labs, medications and tests reviewed, continue all other medications of all above medical condition listed as is.     @[facility]@

## 2020-04-02 ENCOUNTER — TELEPHONE (OUTPATIENT)
Dept: CARDIOLOGY CLINIC | Age: 55
End: 2020-04-02

## 2020-05-27 RX ORDER — CARVEDILOL 6.25 MG/1
6.25 TABLET ORAL 2 TIMES DAILY WITH MEALS
Qty: 60 TABLET | Refills: 3 | Status: SHIPPED | OUTPATIENT
Start: 2020-05-27 | End: 2020-10-02 | Stop reason: SDUPTHER

## 2020-05-27 RX ORDER — FUROSEMIDE 20 MG/1
TABLET ORAL
Qty: 60 TABLET | Refills: 3 | Status: ON HOLD
Start: 2020-05-27 | End: 2020-08-17 | Stop reason: HOSPADM

## 2020-07-01 DIAGNOSIS — I25.5 ISCHEMIC CARDIOMYOPATHY: ICD-10-CM

## 2020-07-02 RX ORDER — FUROSEMIDE 20 MG/1
20 TABLET ORAL 2 TIMES DAILY
Qty: 60 TABLET | Refills: 3 | OUTPATIENT
Start: 2020-07-02

## 2020-07-02 RX ORDER — CARVEDILOL 6.25 MG/1
6.25 TABLET ORAL 2 TIMES DAILY WITH MEALS
Qty: 60 TABLET | Refills: 3 | OUTPATIENT
Start: 2020-07-02

## 2020-08-07 ENCOUNTER — TELEPHONE (OUTPATIENT)
Dept: CARDIOLOGY CLINIC | Age: 55
End: 2020-08-07

## 2020-08-07 NOTE — TELEPHONE ENCOUNTER
Tried calling patient for VV he did not answer, called twice.  Appointment scheduled for 845am with dr Surjit He

## 2020-08-11 ENCOUNTER — NURSE ONLY (OUTPATIENT)
Dept: OTHER | Facility: CLINIC | Age: 55
End: 2020-08-11

## 2020-08-11 NOTE — PROGRESS NOTES
Patient here for complaints of shock on ICD that occurred on Friday night while watching TV. I interrogated ICD, patient did receive 1 shock with 2 paced terminated shocks  I discussed case with Dr Stu Beltran, patient needs to see EP  Will get EP appointment on Friday    Patient does not have his box plugged in at home as she states she was being charged for readings when he did not send in a reading.   Patient states he was never told to go to the Emergency Room if he was shocked  Education provided on what to do if he is shocked  Also education provided to plug in his box   Patient voiced understanding

## 2020-08-14 ENCOUNTER — APPOINTMENT (OUTPATIENT)
Dept: GENERAL RADIOLOGY | Age: 55
DRG: 286 | End: 2020-08-14
Payer: MEDICARE

## 2020-08-14 ENCOUNTER — HOSPITAL ENCOUNTER (INPATIENT)
Age: 55
LOS: 2 days | Discharge: HOME | DRG: 286 | End: 2020-08-16
Attending: INTERNAL MEDICINE | Admitting: INTERNAL MEDICINE
Payer: MEDICARE

## 2020-08-14 ENCOUNTER — OFFICE VISIT (OUTPATIENT)
Dept: CARDIOLOGY CLINIC | Age: 55
End: 2020-08-14

## 2020-08-14 VITALS
RESPIRATION RATE: 20 BRPM | DIASTOLIC BLOOD PRESSURE: 60 MMHG | HEIGHT: 67 IN | SYSTOLIC BLOOD PRESSURE: 100 MMHG | HEART RATE: 73 BPM | BODY MASS INDEX: 20.88 KG/M2 | TEMPERATURE: 97.3 F | OXYGEN SATURATION: 91 % | WEIGHT: 133 LBS

## 2020-08-14 PROBLEM — Z45.02 ICD (IMPLANTABLE CARDIOVERTER-DEFIBRILLATOR) DISCHARGE: Status: ACTIVE | Noted: 2020-08-14

## 2020-08-14 LAB
ALBUMIN SERPL-MCNC: 4 GM/DL (ref 3.4–5)
ALP BLD-CCNC: 130 IU/L (ref 40–129)
ALT SERPL-CCNC: 6 U/L (ref 10–40)
ANION GAP SERPL CALCULATED.3IONS-SCNC: 14 MMOL/L (ref 4–16)
AST SERPL-CCNC: 11 IU/L (ref 15–37)
BASOPHILS ABSOLUTE: 0 K/CU MM
BASOPHILS RELATIVE PERCENT: 0.4 % (ref 0–1)
BILIRUB SERPL-MCNC: 0.4 MG/DL (ref 0–1)
BUN BLDV-MCNC: 12 MG/DL (ref 6–23)
CALCIUM SERPL-MCNC: 9.3 MG/DL (ref 8.3–10.6)
CHLORIDE BLD-SCNC: 99 MMOL/L (ref 99–110)
CO2: 29 MMOL/L (ref 21–32)
CREAT SERPL-MCNC: 1.1 MG/DL (ref 0.9–1.3)
DIFFERENTIAL TYPE: ABNORMAL
EKG ATRIAL RATE: 77 BPM
EKG DIAGNOSIS: NORMAL
EKG P AXIS: 50 DEGREES
EKG P-R INTERVAL: 188 MS
EKG Q-T INTERVAL: 486 MS
EKG QRS DURATION: 194 MS
EKG QTC CALCULATION (BAZETT): 549 MS
EKG R AXIS: -75 DEGREES
EKG T AXIS: 94 DEGREES
EKG VENTRICULAR RATE: 77 BPM
EOSINOPHILS ABSOLUTE: 0.2 K/CU MM
EOSINOPHILS RELATIVE PERCENT: 3.4 % (ref 0–3)
GFR AFRICAN AMERICAN: >60 ML/MIN/1.73M2
GFR NON-AFRICAN AMERICAN: >60 ML/MIN/1.73M2
GLUCOSE BLD-MCNC: 95 MG/DL (ref 70–99)
HCT VFR BLD CALC: 50.8 % (ref 42–52)
HEMOGLOBIN: 15.6 GM/DL (ref 13.5–18)
IMMATURE NEUTROPHIL %: 0.3 % (ref 0–0.43)
LYMPHOCYTES ABSOLUTE: 2 K/CU MM
LYMPHOCYTES RELATIVE PERCENT: 29.7 % (ref 24–44)
MAGNESIUM: 2 MG/DL (ref 1.8–2.4)
MCH RBC QN AUTO: 27.8 PG (ref 27–31)
MCHC RBC AUTO-ENTMCNC: 30.7 % (ref 32–36)
MCV RBC AUTO: 90.4 FL (ref 78–100)
MONOCYTES ABSOLUTE: 0.5 K/CU MM
MONOCYTES RELATIVE PERCENT: 6.7 % (ref 0–4)
NUCLEATED RBC %: 0 %
PDW BLD-RTO: 14.2 % (ref 11.7–14.9)
PLATELET # BLD: 212 K/CU MM (ref 140–440)
PMV BLD AUTO: 10.9 FL (ref 7.5–11.1)
POTASSIUM SERPL-SCNC: 3.4 MMOL/L (ref 3.5–5.1)
PRO-BNP: 2589 PG/ML
RBC # BLD: 5.62 M/CU MM (ref 4.6–6.2)
SEGMENTED NEUTROPHILS ABSOLUTE COUNT: 4 K/CU MM
SEGMENTED NEUTROPHILS RELATIVE PERCENT: 59.5 % (ref 36–66)
SODIUM BLD-SCNC: 142 MMOL/L (ref 135–145)
TOTAL IMMATURE NEUTOROPHIL: 0.02 K/CU MM
TOTAL NUCLEATED RBC: 0 K/CU MM
TOTAL PROTEIN: 7.8 GM/DL (ref 6.4–8.2)
TROPONIN T: <0.01 NG/ML
WBC # BLD: 6.7 K/CU MM (ref 4–10.5)

## 2020-08-14 PROCEDURE — 85025 COMPLETE CBC W/AUTO DIFF WBC: CPT

## 2020-08-14 PROCEDURE — 93000 ELECTROCARDIOGRAM COMPLETE: CPT | Performed by: INTERNAL MEDICINE

## 2020-08-14 PROCEDURE — 6370000000 HC RX 637 (ALT 250 FOR IP): Performed by: NURSE PRACTITIONER

## 2020-08-14 PROCEDURE — 94761 N-INVAS EAR/PLS OXIMETRY MLT: CPT

## 2020-08-14 PROCEDURE — 93010 ELECTROCARDIOGRAM REPORT: CPT | Performed by: INTERNAL MEDICINE

## 2020-08-14 PROCEDURE — 2580000003 HC RX 258: Performed by: NURSE PRACTITIONER

## 2020-08-14 PROCEDURE — 84484 ASSAY OF TROPONIN QUANT: CPT

## 2020-08-14 PROCEDURE — 99285 EMERGENCY DEPT VISIT HI MDM: CPT

## 2020-08-14 PROCEDURE — 99214 OFFICE O/P EST MOD 30 MIN: CPT | Performed by: INTERNAL MEDICINE

## 2020-08-14 PROCEDURE — 6360000002 HC RX W HCPCS: Performed by: NURSE PRACTITIONER

## 2020-08-14 PROCEDURE — 1200000000 HC SEMI PRIVATE

## 2020-08-14 PROCEDURE — 71045 X-RAY EXAM CHEST 1 VIEW: CPT

## 2020-08-14 PROCEDURE — 83735 ASSAY OF MAGNESIUM: CPT

## 2020-08-14 PROCEDURE — 80053 COMPREHEN METABOLIC PANEL: CPT

## 2020-08-14 PROCEDURE — 93005 ELECTROCARDIOGRAM TRACING: CPT | Performed by: EMERGENCY MEDICINE

## 2020-08-14 PROCEDURE — 83880 ASSAY OF NATRIURETIC PEPTIDE: CPT

## 2020-08-14 RX ORDER — POLYETHYLENE GLYCOL 3350 17 G/17G
17 POWDER, FOR SOLUTION ORAL DAILY PRN
Status: DISCONTINUED | OUTPATIENT
Start: 2020-08-14 | End: 2020-08-18 | Stop reason: HOSPADM

## 2020-08-14 RX ORDER — POTASSIUM CHLORIDE 20 MEQ/1
40 TABLET, EXTENDED RELEASE ORAL 2 TIMES DAILY WITH MEALS
Status: DISCONTINUED | OUTPATIENT
Start: 2020-08-14 | End: 2020-08-18 | Stop reason: HOSPADM

## 2020-08-14 RX ORDER — FAMOTIDINE 20 MG/1
20 TABLET, FILM COATED ORAL 2 TIMES DAILY
Status: DISCONTINUED | OUTPATIENT
Start: 2020-08-14 | End: 2020-08-17

## 2020-08-14 RX ORDER — ACETAMINOPHEN 325 MG/1
650 TABLET ORAL EVERY 6 HOURS PRN
Status: DISCONTINUED | OUTPATIENT
Start: 2020-08-14 | End: 2020-08-18 | Stop reason: HOSPADM

## 2020-08-14 RX ORDER — SODIUM CHLORIDE 0.9 % (FLUSH) 0.9 %
10 SYRINGE (ML) INJECTION PRN
Status: DISCONTINUED | OUTPATIENT
Start: 2020-08-14 | End: 2020-08-16 | Stop reason: SDUPTHER

## 2020-08-14 RX ORDER — ASPIRIN 81 MG/1
81 TABLET, CHEWABLE ORAL DAILY
Status: DISCONTINUED | OUTPATIENT
Start: 2020-08-15 | End: 2020-08-18 | Stop reason: HOSPADM

## 2020-08-14 RX ORDER — SODIUM CHLORIDE 0.9 % (FLUSH) 0.9 %
10 SYRINGE (ML) INJECTION EVERY 12 HOURS SCHEDULED
Status: DISCONTINUED | OUTPATIENT
Start: 2020-08-14 | End: 2020-08-16 | Stop reason: SDUPTHER

## 2020-08-14 RX ORDER — SODIUM CHLORIDE 9 MG/ML
INJECTION, SOLUTION INTRAVENOUS CONTINUOUS
Status: ACTIVE | OUTPATIENT
Start: 2020-08-15 | End: 2020-08-15

## 2020-08-14 RX ORDER — PAROXETINE HYDROCHLORIDE 20 MG/1
20 TABLET, FILM COATED ORAL EVERY MORNING
Status: DISCONTINUED | OUTPATIENT
Start: 2020-08-15 | End: 2020-08-18 | Stop reason: HOSPADM

## 2020-08-14 RX ORDER — PROMETHAZINE HYDROCHLORIDE 25 MG/1
12.5 TABLET ORAL EVERY 6 HOURS PRN
Status: DISCONTINUED | OUTPATIENT
Start: 2020-08-14 | End: 2020-08-18 | Stop reason: HOSPADM

## 2020-08-14 RX ORDER — ACETAMINOPHEN 650 MG/1
650 SUPPOSITORY RECTAL EVERY 6 HOURS PRN
Status: DISCONTINUED | OUTPATIENT
Start: 2020-08-14 | End: 2020-08-18 | Stop reason: HOSPADM

## 2020-08-14 RX ORDER — FUROSEMIDE 20 MG/1
20 TABLET ORAL 2 TIMES DAILY
Status: DISCONTINUED | OUTPATIENT
Start: 2020-08-14 | End: 2020-08-15

## 2020-08-14 RX ORDER — ONDANSETRON 2 MG/ML
4 INJECTION INTRAMUSCULAR; INTRAVENOUS EVERY 6 HOURS PRN
Status: DISCONTINUED | OUTPATIENT
Start: 2020-08-14 | End: 2020-08-18 | Stop reason: HOSPADM

## 2020-08-14 RX ORDER — LISINOPRIL 5 MG/1
5 TABLET ORAL DAILY
Status: DISCONTINUED | OUTPATIENT
Start: 2020-08-14 | End: 2020-08-17

## 2020-08-14 RX ORDER — CARVEDILOL 6.25 MG/1
6.25 TABLET ORAL 2 TIMES DAILY WITH MEALS
Status: DISCONTINUED | OUTPATIENT
Start: 2020-08-14 | End: 2020-08-18 | Stop reason: HOSPADM

## 2020-08-14 RX ORDER — AMLODIPINE BESYLATE 5 MG/1
5 TABLET ORAL DAILY
Status: DISCONTINUED | OUTPATIENT
Start: 2020-08-14 | End: 2020-08-15

## 2020-08-14 RX ORDER — AMLODIPINE BESYLATE AND BENAZEPRIL HYDROCHLORIDE 5; 20 MG/1; MG/1
1 CAPSULE ORAL DAILY
Status: DISCONTINUED | OUTPATIENT
Start: 2020-08-14 | End: 2020-08-14 | Stop reason: CLARIF

## 2020-08-14 RX ADMIN — ENOXAPARIN SODIUM 40 MG: 40 INJECTION SUBCUTANEOUS at 21:30

## 2020-08-14 RX ADMIN — FAMOTIDINE 20 MG: 20 TABLET ORAL at 21:28

## 2020-08-14 RX ADMIN — FUROSEMIDE 20 MG: 20 TABLET ORAL at 21:29

## 2020-08-14 RX ADMIN — SODIUM CHLORIDE, PRESERVATIVE FREE 10 ML: 5 INJECTION INTRAVENOUS at 21:31

## 2020-08-14 ASSESSMENT — PAIN SCALES - GENERAL
PAINLEVEL_OUTOF10: 0
PAINLEVEL_OUTOF10: 0

## 2020-08-14 NOTE — ED TRIAGE NOTES
Pt. Presents from home after a visit with Dr. Shani Gee. He states that last week he felt his ICD fire twice last week. Dr. Shani Gee states significant difference in previous EKGs. Pt. Currently has no chest pain or shortness of breath.

## 2020-08-14 NOTE — PROGRESS NOTES
Electrophysiology FU Note          Chief complaint :  ICD shocks    Referring physician:  Jimmy Crews      Primary care physician: Rishabh Cole MD      History of Present Illness: This visit (8/14/2020      Chief Complaint   Patient presents with    Follow-up     Hx ICD (2016). Patient recently was shocked by his device twice. Reports was watching TV last week and his device shocked him twice. Denies palpitations, dizziness, syncope and no edema. He does not have any active chest pain. Off and on shortness of breath with exertion but not at rest. This has been going on more recently. Relieves with rest. Not associated with chest pain           Previous visit: (7/24/2019)      Chief Complaint   Patient presents with    Follow-up     Patient here for follow up and to discuss Amiodarone/Xarelto. Patient denies any new complaints  No chest pain, shortness of breath, palpitations, dizziness. Patient did not want his device interrogated  He is self pay and he did not understand how the reading for May came by  Patient is also not taking xarelto also because he cannot afford it          Previous visit:(7/20/2018)      Chief Complaint   Patient presents with    Atrial Fibrillation     Pt is here because he needs VT rate decreased on ICD. Denies chest pain, shortness of breath  Denies syncope or palpitations  Some shortness of breath with exertion      Previous visit:    Chief Complaint   Patient presents with    Cardiomyopathy     Pt is consult for Ischemic Cardiomyopathy from Dr Merline Burton. Pt had a echo 09/22/16 and EF 20%. He had a stroke in march. He had MI in [facility] with stents. He has finished cardiac rehab. He has shortness of breath with exertion. He is smoking less then a pack a day. He denies chest pain, diziness, palpitation, and edema.             Past medical history:   Past Medical History:   Diagnosis Date    Acute exacerbation of CHF (congestive heart failure) ([facility] [address] 9/20/2016    CAD (coronary artery disease)     Cerebral artery occlusion with cerebral infarction [facility])     Chest pain     COPD (chronic obstructive pulmonary disease) ([facility] [address]     Depression     H/O cardiovascular stress test 5/2/2016    treadmill    H/O Doppler ultrasound 10/14/2016    This  Carotid  ultrasound  is   normal    H/O echocardiogram 4/6/16    EF 25%,     H/O echocardiogram 09/22/2016    EF20%. Dilated cardiomyopathy with severely reduced LV systolic function. Minimal Aortic Stenosis. Paced TR     H/O echocardiogram 07/27/2017    EF 15-20% Mild to mod TR    H/O heart artery stent 5/12/16    successful PTCA and PCI of RCA with 2.5x20mm balloon and then KRYSTLE 2.5X38MM stent     H/O percutaneous left heart catheterization 5/12/16    right coronary artery is a dominant vessel with 85% long proximal to mid segment stenosis    Hyperlipidemia     Hypertension     PAF (paroxysmal atrial fibrillation) ([facility] [address]     Post PTCA 4/6/16    EF 25%, Stented: LAD &Ostial DX , RCA has 80% Mid segment stenosis, LG anterior wall aneurysm     STEMI (ST elevation myocardial infarction) ([facility] [address] 4/6/16    Unstable angina (HCC)     Vertigo        Surgical history :   Past Surgical History:   Procedure Laterality Date    CARDIAC CATHETERIZATION  5/12/16    right coronary artery is a dominant vessel with 85% long proximal to mid segment stenosis    CARDIAC DEFIBRILLATOR PLACEMENT Left 11/22/2016    CORONARY ANGIOPLASTY WITH STENT PLACEMENT  5/12/16    successful PTCA and PCI of RCA with 2.5x20mm balloon and then KRYSTLE 2.5X38MM stent        Family history:   Family History   Problem Relation Age of Onset    Kidney Disease Father     High Blood Pressure Father        Social history :  reports that he has been smoking cigarettes. He has a 60.00 pack-year smoking history. He has never used smokeless tobacco. He reports current drug use. Frequency: 7.00 times per week. Drug: Marijuana.  He reports that he does not drink alcohol. Allergies   Allergen Reactions    Doxycycline        Current Outpatient Medications on File Prior to Visit   Medication Sig Dispense Refill    carvedilol (COREG) 6.25 MG tablet TAKE 1 TABLET BY MOUTH 2 TIMES DAILY (WITH MEALS) 60 tablet 3    furosemide (LASIX) 20 MG tablet TAKE 1 TABLET BY MOUTH TWICE A DAY 60 tablet 3    amLODIPine-benazepril (LOTREL) 5-20 MG per capsule 1 capsule daily       aspirin 81 MG chewable tablet Take 1 tablet by mouth daily 30 tablet 3    PARoxetine (PAXIL) 20 MG tablet Take 20 mg by mouth every morning        No current facility-administered medications on file prior to visit. coreg  lasix    Review of Systems:   Review of Systems   Constitutional:  Negative for chills and fever. HENT: Negative for congestion, ear pain and tinnitus. Eyes: Negative for photophobia, pain and visual disturbance. Respiratory:  SOB Negative for cough, chest tightness and wheezing. Cardiovascular: Negative for chest pain, palpitations and leg swelling. Gastrointestinal: Negative for abdominal pain, blood in stool, constipation, diarrhea, nausea and vomiting. Endocrine: Negative for cold intolerance and heat intolerance. Genitourinary: Negative for dysuria, flank pain and hematuria. Musculoskeletal: Positive for arthralgias. Negative for back pain, myalgias and neck stiffness. Skin: Negative for color change and rash. Allergic/Immunologic: Negative for food allergies. Neurological: Negative for dizziness, light-headedness, numbness and headaches. Hematological: Does not bruise/bleed easily. Psychiatric/Behavioral: Negative for agitation and confusion.            Physical Examination:    /60 (Site: Right Upper Arm)   Pulse 73   Temp 97.3 °F (36.3 °C)   Resp 20   Ht 5' 7\" (1.702 m)   Wt 133 lb (60.3 kg)   SpO2 91%   BMI 20.83 kg/m²    Wt Readings from Last 3 Encounters:   08/14/20 133 lb (60.3 kg)   08/11/20 133 lb 9.6 oz (60.6 kg) 01/27/20 136 lb 9.6 oz (62 kg)     Body mass index is 20.83 kg/m². Physical Exam   Constitutional: He is oriented to person, place, and time and well-developed, well-nourished, and in no distress. HENT:   Head: Normocephalic and atraumatic. Eyes: Conjunctivae and EOM are normal. Pupils are equal, round, and reactive to light. Right eye exhibits no discharge. Neck: Normal range of motion. No JVD present. No thyromegaly present. Cardiovascular: Normal rate and regular rhythm. Exam reveals no friction rub. Murmur heard. Pulmonary/Chest: basilar rales. No wheezes. Abdominal: Soft. Bowel sounds are normal. He exhibits no distension. There is no tenderness. Musculoskeletal: Normal range of motion. He exhibits no edema or tenderness. Neurological: He is alert and oriented to person, place, and time. No cranial nerve deficit. Skin: Skin is warm and dry. No rash noted. No erythema. Psychiatric: Mood and affect normal.         CBC:   Lab Results   Component Value Date    WBC 9.7 09/15/2017    HGB 15.0 09/15/2017    HCT 44.4 09/15/2017     09/15/2017     Lipids:   Lab Results   Component Value Date    CHOL 165 08/31/2016    TRIG 83 08/31/2016    HDL 36 (L) 08/31/2016    LDLDIRECT 116 (H) 08/31/2016     PT/INR:   Lab Results   Component Value Date    INR 1.02 09/15/2017        BMP:    Lab Results   Component Value Date     06/21/2019    K 4.8 06/21/2019     06/21/2019    CO2 25 06/21/2019    BUN 15 06/21/2019     CMP:   Lab Results   Component Value Date    AST 19 09/15/2017    PROT 7.7 09/15/2017    BILITOT 0.4 09/15/2017    ALKPHOS 205 (H) 09/15/2017     TSH:  No results found for: TSH    EKGINTERPRETATION - EKG Interpretation:   Sinus rhythm, appears more like posterior infarct rather than bifascular block which is big change from before      IMPRESSION / RECOMMENDATIONS:     Ventricular tachycardia and Ventricular fibrillation sp ICD shocks    1. Non sustained VT  2.  Ischemic cardiomyopathy  3. Sp ICD  4. History of MI  5. HLD  6. PAF history  7. History of stroke too          Patient is having a lot more episodes of VT and episodes of VF which had to be shocked twice in last 1 week  Patient denies any chest pain but I think he needs to be evaluated  Patient EKG has diffuse changes compared to his old EKG and looks like appears more like posterior infarct rather than bifascular block which is big change from before. Patient does not have outpatient insurance and only has inpatient insurance and he does not want to get any outpatient work-up done because patient is worried that he cannot pay    Also at the same time I requested him to go to the hospital now   Discussed with the patient patient agreed to go to the hospital and but he said he has to go home first and then he will go to the hospital and he does not want EMS to be called    Discussed risk with the patient. Informed Dr. Precious Rubinstein also - I think patient would best served to go to the emergency room and get initial work-up and then admitted to the hospital and possible heart cath for recurrent Ventricular fibrillation / tachycardia    Patient may need to be either on mexiletine or amiodarone but I do not want to start them unless I have the labs and I am not sure if the patient is going to get the lab work done by himself.     Discussed with  and he is agreeable with benson Wang MD         I spent 25 minutes face-to-face with the patient today with more than 50% time for counseling regarding ventricular tachycardia and ischemic cardiomyopathy

## 2020-08-14 NOTE — ED PROVIDER NOTES
EKG Interpretation    EKG performed on 8/14/2020 at 1:48 PM    Interpreted by emergency department physician    Rhythm: normal sinus   Rate: normal  Axis: left  Ectopy: none  Conduction: right bundle branch block (complete)  ST Segments: no acute change  T Waves: no acute change  Q Waves: nonspecific    Clinical Impression: Sinus rhythm. Left axis deviation. Right bundle branch block pattern. Left ventricular hypertrophy.     Price Ortiz 27, DO  08/14/20 1407

## 2020-08-14 NOTE — ED NOTES
Pt sitting in bed. Pt denies any needs. No distress noted.       Michelet Klein, NORBERTO  08/14/20 0628

## 2020-08-14 NOTE — ED PROVIDER NOTES
ultrasound  is   normal    H/O echocardiogram 4/6/16    EF 25%,     H/O echocardiogram 09/22/2016    EF20%. Dilated cardiomyopathy with severely reduced LV systolic function. Minimal Aortic Stenosis. Paced TR     H/O echocardiogram 07/27/2017    EF 15-20% Mild to mod TR    H/O heart artery stent 5/12/16    successful PTCA and PCI of RCA with 2.5x20mm balloon and then KRYSTLE 2.5X38MM stent     H/O percutaneous left heart catheterization 5/12/16    right coronary artery is a dominant vessel with 85% long proximal to mid segment stenosis    Hyperlipidemia     Hypertension     PAF (paroxysmal atrial fibrillation) ([facility] [address]     Post PTCA 4/6/16    EF 25%, Stented: LAD &Ostial DX , RCA has 80% Mid segment stenosis, LG anterior wall aneurysm     STEMI (ST elevation myocardial infarction) ([facility] [address] 4/6/16    Unstable angina (HCC)     Vertigo      Past Surgical History:   Procedure Laterality Date    CARDIAC CATHETERIZATION  5/12/16    right coronary artery is a dominant vessel with 85% long proximal to mid segment stenosis    CARDIAC DEFIBRILLATOR PLACEMENT Left 11/22/2016    CORONARY ANGIOPLASTY WITH STENT PLACEMENT  5/12/16    successful PTCA and PCI of RCA with 2.5x20mm balloon and then KRYSTLE 2.5X38MM stent        CURRENT MEDICATIONS    Current Outpatient Rx   Medication Sig Dispense Refill    carvedilol (COREG) 6.25 MG tablet TAKE 1 TABLET BY MOUTH 2 TIMES DAILY (WITH MEALS) 60 tablet 3    furosemide (LASIX) 20 MG tablet TAKE 1 TABLET BY MOUTH TWICE A DAY 60 tablet 3    amLODIPine-benazepril (LOTREL) 5-20 MG per capsule 1 capsule daily       aspirin 81 MG chewable tablet Take 1 tablet by mouth daily 30 tablet 3    PARoxetine (PAXIL) 20 MG tablet Take 20 mg by mouth every morning          ALLERGIES    Allergies   Allergen Reactions    Doxycycline        SOCIAL AND FAMILY HISTORY    Social History     Socioeconomic History    Marital status:       Spouse name: Not on file    Number of children: Not on file  Years of education: Not on file    Highest education level: Not on file   Occupational History    Not on file   Social Needs    Financial resource strain: Not on file    Food insecurity     Worry: Not on file     Inability: Not on file    Transportation needs     Medical: Not on file     Non-medical: Not on file   Tobacco Use    Smoking status: Current Every Day Smoker     Packs/day: 1.50     Years: 40.00     Pack years: 60.00     Types: Cigarettes    Smokeless tobacco: Never Used    Tobacco comment: declined counseling   Substance and Sexual Activity    Alcohol use: No     Alcohol/week: 0.0 standard drinks     Comment: Caffeine Intake: 7- 8 Mountain Dew cans a day    Drug use: Yes     Frequency: 7.0 times per week     Types: Marijuana     Comment: started when he was 13years old     Sexual activity: Not Currently   Lifestyle    Physical activity     Days per week: Not on file     Minutes per session: Not on file    Stress: Not on file   Relationships    Social connections     Talks on phone: Not on file     Gets together: Not on file     Attends Nondenominational service: Not on file     Active member of club or organization: Not on file     Attends meetings of clubs or organizations: Not on file     Relationship status: Not on file    Intimate partner violence     Fear of current or ex partner: Not on file     Emotionally abused: Not on file     Physically abused: Not on file     Forced sexual activity: Not on file   Other Topics Concern    Not on file   Social History Narrative    Not on file     Family History   Problem Relation Age of Onset    Kidney Disease Father     High Blood Pressure Father          PHYSICAL EXAM    VITAL SIGNS: BP 98/71   Pulse 71   Temp 98.7 °F (37.1 °C) (Oral)   Resp 20   Ht 5' 7\" (1.702 m)   Wt 133 lb (60.3 kg)   SpO2 100%   BMI 20.83 kg/m²    Constitutional:  Well developed, Well nourished  HENT:  Normocephalic, Atraumatic, PERRL. EOMI.   Sclera clear.Conjunctiva normal, No discharge. Neck/Lymphatics: supple, no JVD, no swollen nodes  Cardiovascular:  Normal heart rate, Normal rhythm, No murmurs  Respiratory:  Nonlabored breathing. Normal breath sounds, No wheezing  Abdomen: Bowel sounds normal, Soft, No tenderness, no masses. Musculoskeletal: No edema, No tenderness, No cyanosis  Integument:  Warm, Dry  Neurologic:  Alert & oriented , No focal deficits noted. Cranial nerves II through XII grossly intact.    Psychiatric:  Affect normal, Mood normal.       Labs:  Results for orders placed or performed during the hospital encounter of 08/14/20   CBC Auto Differential   Result Value Ref Range    WBC 6.7 4.0 - 10.5 K/CU MM    RBC 5.62 4.6 - 6.2 M/CU MM    Hemoglobin 15.6 13.5 - 18.0 GM/DL    Hematocrit 50.8 42 - 52 %    MCV 90.4 78 - 100 FL    MCH 27.8 27 - 31 PG    MCHC 30.7 (L) 32.0 - 36.0 %    RDW 14.2 11.7 - 14.9 %    Platelets 517 363 - 466 K/CU MM    MPV 10.9 7.5 - 11.1 FL    Differential Type AUTOMATED DIFFERENTIAL     Segs Relative 59.5 36 - 66 %    Lymphocytes % 29.7 24 - 44 %    Monocytes % 6.7 (H) 0 - 4 %    Eosinophils % 3.4 (H) 0 - 3 %    Basophils % 0.4 0 - 1 %    Segs Absolute 4.0 K/CU MM    Lymphocytes Absolute 2.0 K/CU MM    Monocytes Absolute 0.5 K/CU MM    Eosinophils Absolute 0.2 K/CU MM    Basophils Absolute 0.0 K/CU MM    Nucleated RBC % 0.0 %    Total Nucleated RBC 0.0 K/CU MM    Total Immature Neutrophil 0.02 K/CU MM    Immature Neutrophil % 0.3 0 - 0.43 %   Troponin   Result Value Ref Range    Troponin T <0.010 <0.01 NG/ML   Comprehensive Metabolic Panel   Result Value Ref Range    Sodium 142 135 - 145 MMOL/L    Potassium 3.4 (L) 3.5 - 5.1 MMOL/L    Chloride 99 99 - 110 mMol/L    CO2 29 21 - 32 MMOL/L    BUN 12 6 - 23 MG/DL    CREATININE 1.1 0.9 - 1.3 MG/DL    Glucose 95 70 - 99 MG/DL    Calcium 9.3 8.3 - 10.6 MG/DL    Alb 4.0 3.4 - 5.0 GM/DL    Total Protein 7.8 6.4 - 8.2 GM/DL    Total Bilirubin 0.4 0.0 - 1.0 MG/DL    ALT 6 (L) 10 - 40 U/L    AST 11 (L) 15 - 37 IU/L    Alkaline Phosphatase 130 (H) 40 - 129 IU/L    GFR Non-African American >60 >60 mL/min/1.73m2    GFR African American >60 >60 mL/min/1.73m2    Anion Gap 14 4 - 16   Magnesium   Result Value Ref Range    Magnesium 2.0 1.8 - 2.4 mg/dl   Brain Natriuretic Peptide   Result Value Ref Range    Pro-BNP 2,589 (H) <300 PG/ML   Basic Metabolic Panel w/ Reflex to MG   Result Value Ref Range    Sodium 144 135 - 145 MMOL/L    Potassium 3.4 (L) 3.5 - 5.1 MMOL/L    Chloride 104 99 - 110 mMol/L    CO2 30 21 - 32 MMOL/L    Anion Gap 10 4 - 16    BUN 13 6 - 23 MG/DL    CREATININE 1.2 0.9 - 1.3 MG/DL    Glucose 89 70 - 99 MG/DL    Calcium 8.7 8.3 - 10.6 MG/DL    GFR Non-African American >60 >60 mL/min/1.73m2    GFR African American >60 >60 mL/min/1.73m2   CBC auto differential   Result Value Ref Range    WBC 6.6 4.0 - 10.5 K/CU MM    RBC 4.61 4.6 - 6.2 M/CU MM    Hemoglobin 13.2 (L) 13.5 - 18.0 GM/DL    Hematocrit 41.3 (L) 42 - 52 %    MCV 89.6 78 - 100 FL    MCH 28.6 27 - 31 PG    MCHC 32.0 32.0 - 36.0 %    RDW 14.4 11.7 - 14.9 %    Platelets 590 384 - 320 K/CU MM    MPV 10.9 7.5 - 11.1 FL    Differential Type AUTOMATED DIFFERENTIAL     Segs Relative 51.9 36 - 66 %    Lymphocytes % 33.6 24 - 44 %    Monocytes % 9.2 (H) 0 - 4 %    Eosinophils % 4.5 (H) 0 - 3 %    Basophils % 0.6 0 - 1 %    Segs Absolute 3.4 K/CU MM    Lymphocytes Absolute 2.2 K/CU MM    Monocytes Absolute 0.6 K/CU MM    Eosinophils Absolute 0.3 K/CU MM    Basophils Absolute 0.0 K/CU MM    Nucleated RBC % 0.0 %    Total Nucleated RBC 0.0 K/CU MM    Total Immature Neutrophil 0.01 K/CU MM    Immature Neutrophil % 0.2 0 - 0.43 %   Troponin   Result Value Ref Range    Troponin T <0.010 <0.01 NG/ML   Magnesium   Result Value Ref Range    Magnesium 2.2 1.8 - 2.4 mg/dl   Protime/INR & PTT   Result Value Ref Range    Protime 13.8 11.7 - 14.5 SECONDS    INR 1.14 INDEX    aPTT 47.0 (H) 25.1 - 37.1 SECONDS   Protime-INR   Result Value Ref

## 2020-08-14 NOTE — H&P
History and Physical      Name:  Mony Fraire /Age/Sex: 1965  (62 y.o. male)   MRN & CSN:  [id number] & [id number] Admission Date/Time: 2020  1:45 PM   Location:  ED15/ED-15 PCP: Elton Carney MD       Discussed patient with Dr. Magy Benson and Plan:     Mony Fraire is a 62 y.o.  male  who presents with AICD shocks    - ACS work up/NSVT  AICD shock x 2 last  - runs of vtach  EKG changes; advised to present to ED by Dr Bipin Wylie s/w Dr Michele Chester- consult to Dr Michele Chester  Serial Trop (first < 0.010)  EKG am   NPO MN  Cont home asa    - hypokalemia   3.4  2/2 lasix use  Replace and recheck   Mg 2.0        Chronic  - HTN- Cont BB, Amlodipine-benzapril  - HFrEF- 2017 EF: 10-15%; does not appear in acute exacerbation; cont lasix  - Depression- cont paxil    Discussed patient with ER physician     Diet CARD; NPO MN   DVT Prophylaxis [x] Lovenox, []  Heparin, [] SCDs, [] Ambulation   GI Prophylaxis [] PPI,  [] H2 Blocker,  [] Carafate,  [] Diet/Tube Feeds   Code Status Full   Disposition Patient requires continued admission due to    MDM [] Low, [x] Moderate,[]  High  Patient's risk as above due to      [] One or more chronic illnesses with exacerbation progression      [x] Two or more stable chronic illnesses      [x] Undiagnosed new problem with uncertain prognosis      [] Elective major surgery      []Prescription drug management       History of Present Illness:     Chief Complaint: AICD shock    Mony Fraire is a 62 y.o.  male  who presents with the above complaints, onset last week. Patient has hx of CHF, had 2 shocks from AICD last Friday. Followed with Dr Bipin Wylie, who recommended ED visit due to NSVT on pacer interrogation. By report, Dr Bipin Wylie s/w Dr Michele Chester about need for possible cath. Patient denies current/recent chest pain/pressure, sob, back pain, diaphoresis. No AICD d/c since last Friday. He reports otherwise being in baseline health status.  Denies regular echocardiogram 09/22/2016    EF20%. Dilated cardiomyopathy with severely reduced LV systolic function. Minimal Aortic Stenosis. Paced TR     H/O echocardiogram 07/27/2017    EF 15-20% Mild to mod TR    H/O heart artery stent 5/12/16    successful PTCA and PCI of RCA with 2.5x20mm balloon and then KRYSTLE 2.5X38MM stent     H/O percutaneous left heart catheterization 5/12/16    right coronary artery is a dominant vessel with 85% long proximal to mid segment stenosis    Hyperlipidemia     Hypertension     [facility]ox[facility] fibrillation) ([facility] [address]     Post PTCA 4/6/16    EF 25%, Stented: LAD &Ostial DX , RCA has 80% Mid segment stenosis, LG anterior wall aneurysm     STEMI (ST elevation myocardial infarction) ([facility] [address] 4/6/16    Unstable angina (HCC)     Vertigo        PSHX:  has a past surgical history that includes Cardiac catheterization (5/12/16); Coronary angioplasty with stent (5/12/16); and Cardiac defibrillator placement (Left, 11/22/2016). Allergies: Allergies   Allergen Reactions    Doxycycline        FAM HX: family history includes High Blood Pressure in his father; Kidney Disease in his father. Soc HX:   Social History     Socioeconomic History    Marital status:       Spouse name: Not on file    Number of children: Not on file    Years of education: Not on file    Highest education level: Not on file   Occupational History    Not on file   Social Needs    Financial resource strain: Not on file    Food insecurity     Worry: Not on file     Inability: Not on file    Transportation needs     Medical: Not on file     Non-medical: Not on file   Tobacco Use    Smoking status: Current Every Day Smoker     Packs/day: 1.50     Years: 40.00     Pack years: 60.00     Types: Cigarettes    Smokeless tobacco: Never Used    Tobacco comment: declined counseling   Substance and Sexual Activity    Alcohol use: No     Alcohol/week: 0.0 standard drinks     Comment: Caffeine Intake: 7- [address] Dew cans a day    Drug use: Yes     Frequency: 7.0 times per week     Types: Marijuana     Comment: started when he was 13years old     Sexual activity: Not Currently   Lifestyle    Physical activity     Days per week: Not on file     Minutes per session: Not on file    Stress: Not on file   Relationships    Social connections     Talks on phone: Not on file     Gets together: Not on file     Attends Anabaptism service: Not on file     Active member of club or organization: Not on file     Attends meetings of clubs or organizations: Not on file     Relationship status: Not on file    Intimate partner violence     Fear of current or ex partner: Not on file     Emotionally abused: Not on file     Physically abused: Not on file     Forced sexual activity: Not on file   Other Topics Concern    Not on file   Social History Narrative    Not on file       Medications:     Medications:      carvedilol (COREG) 6.25 MG tablet  TAKE 1 TABLET BY MOUTH 2 TIMES DAILY (WITH MEALS)  JIMENEZ Gill CNP  Needs Review    furosemide (LASIX) 20 MG tablet  TAKE 1 TABLET BY MOUTH TWICE A DAY  JIMENEZ Gill CNP  Needs Review    amLODIPine-benazepril (LOTREL) 5-20 MG per capsule  1 capsule daily  Historical Provider, MD  Needs Review    aspirin 81 MG chewable tablet  Take 1 tablet by mouth daily  PIPPA Slade MD  Needs Review    PARoxetine (PAXIL) 20 MG tablet  Take 20 mg by mouth every morning  Historical Provider, MD  Needs Review        Data:     Normal sinus rhythm   Left axis deviation   Right bundle branch block   Anteroseptal infarct (cited on or before 20-SEP-2016)   Abnormal ECG   When compared with ECG of 15-SEP-2017 18:39,   Right bundle branch block is now present   Questionable change in initial forces of Anteroseptal leads   Confirmed by [facility] Selina SLAUGHTER ([id number]) on 8/14/2020 4:09:17 PM     Electronically signed by JIMENEZ Parker NP on 8/14/2020 at 4:24 PM

## 2020-08-14 NOTE — PROGRESS NOTES
HOSPITALIST    Patient reports that last Friday, 1 week ago, his defibrillator went off twice. He was seen by cardiology 3 days ago and by electrophysiology today. An EKG was done today at the electrophysiology office and showed some changes concerning for ischemia, and he was sent to the hospital.  The EKG was quite different from his last one. Per electrophysiology he may need a heart cath as he is having a lot more episodes of VT and episodes of ventricular fibrillation for which he was shocked twice in the past week. .    The patient was resting comfortably and denied any complaints when I saw him. He reported that he is able to drive, and that he can afford his medications.

## 2020-08-15 PROBLEM — Z86.73 H/O: CVA (CEREBROVASCULAR ACCIDENT): Status: ACTIVE | Noted: 2020-08-15

## 2020-08-15 PROBLEM — I25.5 ISCHEMIC CARDIOMYOPATHY: Status: ACTIVE | Noted: 2020-08-15

## 2020-08-15 PROBLEM — I25.10 ASCVD (ARTERIOSCLEROTIC CARDIOVASCULAR DISEASE): Status: ACTIVE | Noted: 2020-08-15

## 2020-08-15 LAB
ANION GAP SERPL CALCULATED.3IONS-SCNC: 10 MMOL/L (ref 4–16)
BASOPHILS ABSOLUTE: 0 K/CU MM
BASOPHILS RELATIVE PERCENT: 0.6 % (ref 0–1)
BUN BLDV-MCNC: 13 MG/DL (ref 6–23)
CALCIUM SERPL-MCNC: 8.7 MG/DL (ref 8.3–10.6)
CHLORIDE BLD-SCNC: 104 MMOL/L (ref 99–110)
CO2: 30 MMOL/L (ref 21–32)
CREAT SERPL-MCNC: 1.2 MG/DL (ref 0.9–1.3)
DIFFERENTIAL TYPE: ABNORMAL
EOSINOPHILS ABSOLUTE: 0.3 K/CU MM
EOSINOPHILS RELATIVE PERCENT: 4.5 % (ref 0–3)
GFR AFRICAN AMERICAN: >60 ML/MIN/1.73M2
GFR NON-AFRICAN AMERICAN: >60 ML/MIN/1.73M2
GLUCOSE BLD-MCNC: 89 MG/DL (ref 70–99)
HCT VFR BLD CALC: 41.3 % (ref 42–52)
HEMOGLOBIN: 13.2 GM/DL (ref 13.5–18)
IMMATURE NEUTROPHIL %: 0.2 % (ref 0–0.43)
LYMPHOCYTES ABSOLUTE: 2.2 K/CU MM
LYMPHOCYTES RELATIVE PERCENT: 33.6 % (ref 24–44)
MAGNESIUM: 2.2 MG/DL (ref 1.8–2.4)
MCH RBC QN AUTO: 28.6 PG (ref 27–31)
MCHC RBC AUTO-ENTMCNC: 32 % (ref 32–36)
MCV RBC AUTO: 89.6 FL (ref 78–100)
MONOCYTES ABSOLUTE: 0.6 K/CU MM
MONOCYTES RELATIVE PERCENT: 9.2 % (ref 0–4)
NUCLEATED RBC %: 0 %
PDW BLD-RTO: 14.4 % (ref 11.7–14.9)
PLATELET # BLD: 167 K/CU MM (ref 140–440)
PMV BLD AUTO: 10.9 FL (ref 7.5–11.1)
POTASSIUM SERPL-SCNC: 3.4 MMOL/L (ref 3.5–5.1)
RBC # BLD: 4.61 M/CU MM (ref 4.6–6.2)
SEGMENTED NEUTROPHILS ABSOLUTE COUNT: 3.4 K/CU MM
SEGMENTED NEUTROPHILS RELATIVE PERCENT: 51.9 % (ref 36–66)
SODIUM BLD-SCNC: 144 MMOL/L (ref 135–145)
TOTAL IMMATURE NEUTOROPHIL: 0.01 K/CU MM
TOTAL NUCLEATED RBC: 0 K/CU MM
TROPONIN T: <0.01 NG/ML
WBC # BLD: 6.6 K/CU MM (ref 4–10.5)

## 2020-08-15 PROCEDURE — 6370000000 HC RX 637 (ALT 250 FOR IP): Performed by: INTERNAL MEDICINE

## 2020-08-15 PROCEDURE — 99253 IP/OBS CNSLTJ NEW/EST LOW 45: CPT | Performed by: INTERNAL MEDICINE

## 2020-08-15 PROCEDURE — 6370000000 HC RX 637 (ALT 250 FOR IP): Performed by: NURSE PRACTITIONER

## 2020-08-15 PROCEDURE — 85025 COMPLETE CBC W/AUTO DIFF WBC: CPT

## 2020-08-15 PROCEDURE — 2580000003 HC RX 258: Performed by: NURSE PRACTITIONER

## 2020-08-15 PROCEDURE — 94761 N-INVAS EAR/PLS OXIMETRY MLT: CPT

## 2020-08-15 PROCEDURE — 1200000000 HC SEMI PRIVATE

## 2020-08-15 PROCEDURE — 83735 ASSAY OF MAGNESIUM: CPT

## 2020-08-15 PROCEDURE — 80048 BASIC METABOLIC PNL TOTAL CA: CPT

## 2020-08-15 PROCEDURE — 84484 ASSAY OF TROPONIN QUANT: CPT

## 2020-08-15 PROCEDURE — 6360000002 HC RX W HCPCS: Performed by: INTERNAL MEDICINE

## 2020-08-15 RX ORDER — FUROSEMIDE 10 MG/ML
40 INJECTION INTRAMUSCULAR; INTRAVENOUS 2 TIMES DAILY
Status: DISCONTINUED | OUTPATIENT
Start: 2020-08-15 | End: 2020-08-17

## 2020-08-15 RX ORDER — SPIRONOLACTONE 25 MG/1
25 TABLET ORAL DAILY
Status: DISCONTINUED | OUTPATIENT
Start: 2020-08-15 | End: 2020-08-18 | Stop reason: HOSPADM

## 2020-08-15 RX ORDER — ATORVASTATIN CALCIUM 40 MG/1
40 TABLET, FILM COATED ORAL NIGHTLY
Status: DISCONTINUED | OUTPATIENT
Start: 2020-08-15 | End: 2020-08-18 | Stop reason: HOSPADM

## 2020-08-15 RX ADMIN — ATORVASTATIN CALCIUM 40 MG: 40 TABLET, FILM COATED ORAL at 20:31

## 2020-08-15 RX ADMIN — ASPIRIN 81 MG CHEWABLE TABLET 81 MG: 81 TABLET CHEWABLE at 11:06

## 2020-08-15 RX ADMIN — SODIUM CHLORIDE, PRESERVATIVE FREE 10 ML: 5 INJECTION INTRAVENOUS at 20:31

## 2020-08-15 RX ADMIN — FAMOTIDINE 20 MG: 20 TABLET ORAL at 11:06

## 2020-08-15 RX ADMIN — SPIRONOLACTONE 25 MG: 25 TABLET ORAL at 12:25

## 2020-08-15 RX ADMIN — FUROSEMIDE 40 MG: 10 INJECTION, SOLUTION INTRAMUSCULAR; INTRAVENOUS at 17:39

## 2020-08-15 RX ADMIN — AMLODIPINE BESYLATE 5 MG: 5 TABLET ORAL at 11:05

## 2020-08-15 RX ADMIN — POTASSIUM CHLORIDE 40 MEQ: 1500 TABLET, EXTENDED RELEASE ORAL at 17:39

## 2020-08-15 RX ADMIN — PAROXETINE HYDROCHLORIDE 20 MG: 20 TABLET, FILM COATED ORAL at 11:05

## 2020-08-15 RX ADMIN — SODIUM CHLORIDE: 900 INJECTION INTRAVENOUS at 00:30

## 2020-08-15 RX ADMIN — POTASSIUM CHLORIDE 40 MEQ: 1500 TABLET, EXTENDED RELEASE ORAL at 11:06

## 2020-08-15 RX ADMIN — LISINOPRIL 5 MG: 5 TABLET ORAL at 11:05

## 2020-08-15 RX ADMIN — FUROSEMIDE 40 MG: 10 INJECTION, SOLUTION INTRAMUSCULAR; INTRAVENOUS at 11:05

## 2020-08-15 RX ADMIN — CARVEDILOL 6.25 MG: 6.25 TABLET, FILM COATED ORAL at 11:05

## 2020-08-15 RX ADMIN — SODIUM CHLORIDE, PRESERVATIVE FREE 10 ML: 5 INJECTION INTRAVENOUS at 11:06

## 2020-08-15 RX ADMIN — FAMOTIDINE 20 MG: 20 TABLET ORAL at 20:31

## 2020-08-15 ASSESSMENT — PAIN SCALES - GENERAL
PAINLEVEL_OUTOF10: 0

## 2020-08-15 NOTE — CONSULTS
CARDIOLOGY CONSULT NOTE   Reason for consultation:  icd activation , VT     Referring physician:  Alex Nur MD     Primary care physician: Sarah Castillo MD      Dear  Dr. Alex Nur MD   Thanks for the consult. Chief Complaints :  Chief Complaint   Patient presents with    Pacemaker Problem     was sent my Marguarite Ariannater for EKG changes, says a week ago ICD went off        History of present illness:Marcial is a 62 y. o.year old who reports ICD activation a week before presenting to the hospital he was seen as an outpatient by Dr. Char Núñez electrophysiology and was advised to come to the hospital.  He has been more winded and short of breath his chest x-ray is concerning for pulmonary edema. He does have history of coronary artery disease history of STEMI in 2016 leading to LAD stent and RCA stent. He got an ICD due to severe cardiomyopathy EF of 15% range. Reports compliance with medication but not with salt. Blood pressures running low. He denies any chest pain he was watching television when the device got activated he denies any chest pain before or after the event  He says he has difficulty laying down flat he is short of breath and starts coughing when he lays down. Past medical history:    has a past medical history of Acute exacerbation of CHF (congestive heart failure) ([facility] [address] CAD (coronary artery disease), Cerebral artery occlusion with cerebral infarction [facility]), Chest pain, COPD (chronic obstructive pulmonary disease) ([facility] [address] Depression, H/O cardiovascular stress test, H/O Doppler ultrasound, H/O echocardiogram, H/O echocardiogram, H/O echocardiogram, H/O heart artery stent, H/O percutaneous left heart catheterization, Hyperlipidemia, Hypertension, PAF (paroxysmal atrial fibrillation) ([facility] [address] Post PTCA, STEMI (ST elevation myocardial infarction) ([facility] [address] Unstable angina ([email or web address] and Vertigo.   Past surgical history:   has a past surgical history that includes Cardiac catheterization (5/12/16); Coronary angioplasty with stent (5/12/16); and Cardiac defibrillator placement (Left, 11/22/2016). Social History:   reports that he has been smoking cigarettes. He has a 60.00 pack-year smoking history. He has never used smokeless tobacco. He reports current drug use. Frequency: 7.00 times per week. Drug: Marijuana. He reports that he does not drink alcohol.   Family history:   no family history of CAD, STROKE of DM at early age    Allergies   Allergen Reactions    Doxycycline        furosemide (LASIX) injection 40 mg, BID  sodium chloride flush 0.9 % injection 10 mL, 2 times per day  sodium chloride flush 0.9 % injection 10 mL, PRN  acetaminophen (TYLENOL) tablet 650 mg, Q6H PRN    Or  acetaminophen (TYLENOL) suppository 650 mg, Q6H PRN  polyethylene glycol (GLYCOLAX) packet 17 g, Daily PRN  promethazine (PHENERGAN) tablet 12.5 mg, Q6H PRN    Or  ondansetron (ZOFRAN) injection 4 mg, Q6H PRN  famotidine (PEPCID) tablet 20 mg, BID  enoxaparin (LOVENOX) injection 40 mg, Daily  potassium chloride (KLOR-CON M) extended release tablet 40 mEq, BID WC  aspirin chewable tablet 81 mg, Daily  carvedilol (COREG) tablet 6.25 mg, BID WC  PARoxetine (PAXIL) tablet 20 mg, QAM  0.9 % sodium chloride infusion, Continuous  amLODIPine (NORVASC) tablet 5 mg, Daily    And  lisinopril (PRINIVIL;ZESTRIL) tablet 5 mg, Daily      Current Facility-Administered Medications   Medication Dose Route Frequency Provider Last Rate Last Dose    furosemide (LASIX) injection 40 mg  40 mg Intravenous BID Val Reynoso MD   40 mg at 08/15/20 1105    sodium chloride flush 0.9 % injection 10 mL  10 mL Intravenous 2 times per day JIMENEZ Van - NP   10 mL at 08/15/20 1106    sodium chloride flush 0.9 % injection 10 mL  10 mL Intravenous PRN JIMENEZ Van - NP        acetaminophen (TYLENOL) tablet 650 mg  650 mg Oral Q6H PRN JIMENEZ Van - NP        Or    acetaminophen (TYLENOL) suppository 650 mg  650 104   CO2 30   BUN 13   CREATININE 1.2     Recent Labs     08/14/20  1412   AST 11*   ALT 6*   BILITOT 0.4   ALKPHOS 130*     Recent Labs     08/14/20  1412 08/15/20  0149   TROPONINT <0.010 <0.010       Recent Labs     08/14/20  1412   PROBNP 2,589*     Lab Results   Component Value Date    INR 1.02 09/15/2017    PROTIME 11.6 09/15/2017       EKG: (reviewed by myself)    ECHO:(reviewed by myself)    Chest Xray:(reviewed by myself)  Xr Chest Portable    Result Date: 8/14/2020  EXAMINATION: ONE XRAY VIEW OF THE CHEST 8/14/2020 1:59 pm COMPARISON: 09/15/2017 HISTORY: ORDERING SYSTEM PROVIDED HISTORY: icd TECHNOLOGIST PROVIDED HISTORY: Reason for exam:->icd Reason for Exam: chest pain / pacer issue Acuity: Acute Type of Exam: Initial FINDINGS: Cardiac AICD is stable in positioning. Cardiomegaly with pulmonary vascular congestion. No pleural effusion or pneumothorax. No other focal consolidation. Calcified granuloma in the right lung base. Osseous structures are stable. Diffuse osteopenia. Cardiomegaly with pulmonary vascular congestion. Stable positioning of cardiac AICD. All labs, medications and tests reviewed by myself including data  from outside source , patient and available family . Continue all other medications of all above medical condition listed as is. Impression:  Principal Problem:    ICD (implantable cardioverter-defibrillator) discharge  Active Problems:    Gait disturbance, post-stroke    Acute congestive heart failure (HCC)    S/P ICD (internal cardiac defibrillator) procedure    Ischemic cardiomyopathy    ASCVD (arteriosclerotic cardiovascular disease)    H/O: CVA (cerebrovascular accident)  Resolved Problems:    * No resolved hospital problems. *      Assessment: 62 y. o.year old with PMH of  has a past medical history of Acute exacerbation of CHF (congestive heart failure) ([facility] [address] CAD (coronary artery disease), Cerebral artery occlusion with cerebral infarction [facility]), Chest pain, COPD (chronic obstructive pulmonary disease) (HCC), Depression, H/O cardiovascular stress test, H/O Doppler ultrasound, H/O echocardiogram, H/O echocardiogram, H/O echocardiogram, H/O heart artery stent, H/O percutaneous left heart catheterization, Hyperlipidemia, Hypertension, PAF (paroxysmal atrial fibrillation) ([facility] [address] Post PTCA, STEMI (ST elevation myocardial infarction) ([facility] [address] Unstable angina ([facility] [address] and Vertigo. Plan and Recommendations:    Acute decompensated heart failure based on chest x-ray and elevated BNP level, increase Lasix IV stop Norvasc to titrate up cardiac meds  He may cardiomyopathy with ejection fraction of 15 to 20% history of anterior wall MI and RCA stents  CHF: Acute Systolic/ Diastolic decompensated heart failure. Appears to be volume overloaded . Agree with diuresis. We can try IV Lasix 40 mg BID. Depending on response we can titrate diuretics accordingly. Please continue Gudelines recommended medical thearpy including Coreg/ Toprol XL  , ACE/ARB  and Aldactone 25 mg daily. Strict Is and Os and Daily weights. chf nurse consult  VT status post ICD activation: We will plan cardiac cath to rule out ischemic etiology once patient is able to lie flat and not winded  afib? Device has shown episodes of A. fib in the past he does have history of stroke? Will discuss with primary cardiology team he is not on anticoagulation? DVT prophylaxis if no contraindication  6. Dyslipidemia: continue statins           Thank you  much for consult and giving us the opportunity in contributing in the care of this patient. Please feel free to call me for any questions.        Shine Mccain MD, 8/15/2020 11:32 AM

## 2020-08-16 LAB
APTT: 47 SECONDS (ref 25.1–37.1)
INR BLD: 1.14 INDEX
PROTHROMBIN TIME: 13.8 SECONDS (ref 11.7–14.5)

## 2020-08-16 PROCEDURE — C1887 CATHETER, GUIDING: HCPCS

## 2020-08-16 PROCEDURE — B2111ZZ FLUOROSCOPY OF MULTIPLE CORONARY ARTERIES USING LOW OSMOLAR CONTRAST: ICD-10-PCS | Performed by: INTERNAL MEDICINE

## 2020-08-16 PROCEDURE — APPSS30 APP SPLIT SHARED TIME 16-30 MINUTES: Performed by: NURSE PRACTITIONER

## 2020-08-16 PROCEDURE — 99233 SBSQ HOSP IP/OBS HIGH 50: CPT | Performed by: INTERNAL MEDICINE

## 2020-08-16 PROCEDURE — C1894 INTRO/SHEATH, NON-LASER: HCPCS

## 2020-08-16 PROCEDURE — 6370000000 HC RX 637 (ALT 250 FOR IP): Performed by: INTERNAL MEDICINE

## 2020-08-16 PROCEDURE — 2580000003 HC RX 258: Performed by: NURSE PRACTITIONER

## 2020-08-16 PROCEDURE — C1769 GUIDE WIRE: HCPCS

## 2020-08-16 PROCEDURE — 6360000002 HC RX W HCPCS: Performed by: INTERNAL MEDICINE

## 2020-08-16 PROCEDURE — 85730 THROMBOPLASTIN TIME PARTIAL: CPT

## 2020-08-16 PROCEDURE — 6360000004 HC RX CONTRAST MEDICATION

## 2020-08-16 PROCEDURE — 4A023N7 MEASUREMENT OF CARDIAC SAMPLING AND PRESSURE, LEFT HEART, PERCUTANEOUS APPROACH: ICD-10-PCS | Performed by: INTERNAL MEDICINE

## 2020-08-16 PROCEDURE — 93458 L HRT ARTERY/VENTRICLE ANGIO: CPT

## 2020-08-16 PROCEDURE — 2709999900 HC NON-CHARGEABLE SUPPLY

## 2020-08-16 PROCEDURE — 2500000003 HC RX 250 WO HCPCS

## 2020-08-16 PROCEDURE — 6360000002 HC RX W HCPCS

## 2020-08-16 PROCEDURE — 2140000000 HC CCU INTERMEDIATE R&B

## 2020-08-16 PROCEDURE — 85610 PROTHROMBIN TIME: CPT

## 2020-08-16 PROCEDURE — B2151ZZ FLUOROSCOPY OF LEFT HEART USING LOW OSMOLAR CONTRAST: ICD-10-PCS | Performed by: INTERNAL MEDICINE

## 2020-08-16 PROCEDURE — 2580000003 HC RX 258: Performed by: INTERNAL MEDICINE

## 2020-08-16 PROCEDURE — 93458 L HRT ARTERY/VENTRICLE ANGIO: CPT | Performed by: INTERNAL MEDICINE

## 2020-08-16 RX ORDER — SODIUM CHLORIDE 0.9 % (FLUSH) 0.9 %
10 SYRINGE (ML) INJECTION EVERY 12 HOURS SCHEDULED
Status: DISCONTINUED | OUTPATIENT
Start: 2020-08-16 | End: 2020-08-18 | Stop reason: HOSPADM

## 2020-08-16 RX ORDER — HYDRALAZINE HYDROCHLORIDE 20 MG/ML
10 INJECTION INTRAMUSCULAR; INTRAVENOUS EVERY 10 MIN PRN
Status: DISCONTINUED | OUTPATIENT
Start: 2020-08-16 | End: 2020-08-18 | Stop reason: HOSPADM

## 2020-08-16 RX ORDER — SODIUM CHLORIDE 0.9 % (FLUSH) 0.9 %
10 SYRINGE (ML) INJECTION PRN
Status: DISCONTINUED | OUTPATIENT
Start: 2020-08-16 | End: 2020-08-18 | Stop reason: HOSPADM

## 2020-08-16 RX ORDER — WARFARIN SODIUM 5 MG/1
5 TABLET ORAL DAILY
Status: DISCONTINUED | OUTPATIENT
Start: 2020-08-16 | End: 2020-08-18 | Stop reason: HOSPADM

## 2020-08-16 RX ORDER — 0.9 % SODIUM CHLORIDE 0.9 %
500 INTRAVENOUS SOLUTION INTRAVENOUS ONCE
Status: DISCONTINUED | OUTPATIENT
Start: 2020-08-16 | End: 2020-08-16

## 2020-08-16 RX ORDER — WARFARIN SODIUM 5 MG/1
5 TABLET ORAL DAILY
Status: DISCONTINUED | OUTPATIENT
Start: 2020-08-16 | End: 2020-08-16

## 2020-08-16 RX ORDER — SODIUM CHLORIDE 9 MG/ML
INJECTION, SOLUTION INTRAVENOUS CONTINUOUS
Status: DISCONTINUED | OUTPATIENT
Start: 2020-08-16 | End: 2020-08-18 | Stop reason: HOSPADM

## 2020-08-16 RX ORDER — ACETAMINOPHEN 325 MG/1
650 TABLET ORAL EVERY 4 HOURS PRN
Status: DISCONTINUED | OUTPATIENT
Start: 2020-08-16 | End: 2020-08-18 | Stop reason: HOSPADM

## 2020-08-16 RX ORDER — LORAZEPAM 0.5 MG/1
0.5 TABLET ORAL
Status: DISCONTINUED | OUTPATIENT
Start: 2020-08-16 | End: 2020-08-16 | Stop reason: HOSPADM

## 2020-08-16 RX ADMIN — FAMOTIDINE 20 MG: 20 TABLET ORAL at 20:33

## 2020-08-16 RX ADMIN — SODIUM CHLORIDE, PRESERVATIVE FREE 10 ML: 5 INJECTION INTRAVENOUS at 11:23

## 2020-08-16 RX ADMIN — POTASSIUM CHLORIDE 40 MEQ: 1500 TABLET, EXTENDED RELEASE ORAL at 17:32

## 2020-08-16 RX ADMIN — FUROSEMIDE 40 MG: 10 INJECTION, SOLUTION INTRAMUSCULAR; INTRAVENOUS at 17:32

## 2020-08-16 RX ADMIN — FUROSEMIDE 40 MG: 10 INJECTION, SOLUTION INTRAMUSCULAR; INTRAVENOUS at 11:22

## 2020-08-16 RX ADMIN — ATORVASTATIN CALCIUM 40 MG: 40 TABLET, FILM COATED ORAL at 20:34

## 2020-08-16 RX ADMIN — WARFARIN SODIUM 5 MG: 5 TABLET ORAL at 20:34

## 2020-08-16 RX ADMIN — SODIUM CHLORIDE, PRESERVATIVE FREE 10 ML: 5 INJECTION INTRAVENOUS at 20:35

## 2020-08-16 RX ADMIN — SODIUM CHLORIDE: 9 INJECTION, SOLUTION INTRAVENOUS at 10:49

## 2020-08-16 RX ADMIN — CARVEDILOL 6.25 MG: 6.25 TABLET, FILM COATED ORAL at 17:32

## 2020-08-16 ASSESSMENT — PAIN SCALES - GENERAL
PAINLEVEL_OUTOF10: 0

## 2020-08-16 NOTE — PROGRESS NOTES
Cardiology Progress Note     Today's Plan will plan for LHC today     Admit Date:  8/14/2020    Consult reason/ Seen today for: VT, ICD activation     Subjective and  Overnight Events:  States he is feeling better. Denies any chest pain. Reports his SOB has improved. He is able to lay flat. For LHC- risk and benefits discussed with patient     Alternates and risk of the procedure were dicussed in detail  Patient is in agreement to proceed  Mallampati is 2  ASA is  3  Telemetry SR   No arhythmia noted    Assessment / Plan / Recommendation:     1. VT- s/p ICD activation- will plan for LHC to rule out ischemia as cause  2. Acute on chronic combined  decompensated heart failure. Appears to be volume overloaded - has SOB- elevated BNP and pulmonary vascular congestion- Fluid status is not documented. Continue with IV diuresis. Please continue Gudelines recommended medical thearpy including b- blocker, ACEI and Aldactone daily. Daily weights, strict Is and O's   3. Severely depressed left ventricular systolic function:  EF 30-35%- Has ICD in place- on GDMT  4. ASCVD- h/o anterior wall MI/ PCI wit KRYSTLE  x 2  to RCA (2016)- troponin negative. Having VT on ICD- plan for LHC to rule out progression of CAD- continue with BB and statin  5. Tobacco abuse- encouraged to stop smoking    6. Dyslipidemia will check lipids - continue with statin   7. ? Atrial fib per ICD analysis -       History of Presenting Illness:    Chief complain on admission : 62 y. o.year old who is admitted for  Chief Complaint   Patient presents with    Pacemaker Problem     was sent my Joann Bernard for EKG changes, says a week ago ICD went off        Past medical history:    has a past medical history of Acute exacerbation of CHF (congestive heart failure) ([facility] [address] CAD (coronary artery disease), Cerebral artery occlusion with cerebral infarction [facility]), Chest pain, COPD (chronic obstructive pulmonary disease) ([facility] [address] Depression, H/O cardiovascular stress test, H/O Doppler ultrasound, H/O echocardiogram, H/O echocardiogram, H/O echocardiogram, H/O heart artery stent, H/O percutaneous left heart catheterization, Hyperlipidemia, Hypertension, PAF (paroxysmal atrial fibrillation) ([facility] [address] Post PTCA, STEMI (ST elevation myocardial infarction) ([facility] [address] Unstable angina ([facility] [address] and Vertigo. Past surgical history:   has a past surgical history that includes Cardiac catheterization (5/12/16); Coronary angioplasty with stent (5/12/16); and Cardiac defibrillator placement (Left, 11/22/2016). Social History:   reports that he has been smoking cigarettes. He has a 60.00 pack-year smoking history. He has never used smokeless tobacco. He reports current drug use. Frequency: 7.00 times per week. Drug: Marijuana. He reports that he does not drink alcohol. Family history:  family history includes High Blood Pressure in his father; Kidney Disease in his father. Allergies   Allergen Reactions    Doxycycline        Review of Systems:   All 14 systems were reviewed and are negative  Except for the positive findings which are documented     BP 92/70   Pulse 75   Temp 98.4 °F (36.9 °C) (Oral)   Resp 16   Ht 5' 7\" (1.702 m)   Wt 129 lb (58.5 kg)   SpO2 95%   BMI 20.20 kg/m²       Intake/Output Summary (Last 24 hours) at 8/16/2020 1008  Last data filed at 8/16/2020 0457  Gross per 24 hour   Intake --   Output 650 ml   Net -650 ml       Physical Exam:  Constitutional:   No acute distress  HENT:  Normocephalic, Atraumatic, Bilateral external ears normal,  Nose normal.   Neck- trachea is midline  Eyes:  PEERL, Conjunctiva normal  Respiratory:   No respiratory distress, + expiratory wheezing, No chest tenderness.    Cardiovascular:  Normal heart rate, Normal rhythm, + 2/6systolic murmurs appreciated, No rubs appreciated, No gallops appreciated, JVP not elevated  Abdomen/GI:  Soft, No tenderness  Musculoskeletal: Intact distal pulses, no edema to lower legs,  No tenderness, No cyanosis, No clubbing. Integument:  Warm, Dry  Lymphatic:  No lymphadenopathy noted. Neurologic:  Alert & oriented  Psychiatric:  Affect and Mood :pleasant     Medications:    furosemide  40 mg Intravenous BID    atorvastatin  40 mg Oral Nightly    spironolactone  25 mg Oral Daily    sodium chloride flush  10 mL Intravenous 2 times per day    famotidine  20 mg Oral BID    enoxaparin  40 mg Subcutaneous Daily    potassium chloride  40 mEq Oral BID WC    aspirin  81 mg Oral Daily    carvedilol  6.25 mg Oral BID WC    PARoxetine  20 mg Oral QAM    lisinopril  5 mg Oral Daily       sodium chloride flush, acetaminophen **OR** acetaminophen, polyethylene glycol, promethazine **OR** ondansetron    Lab Data:  CBC:   Recent Labs     08/14/20  1412 08/15/20  0149   WBC 6.7 6.6   HGB 15.6 13.2*   HCT 50.8 41.3*   MCV 90.4 89.6    167     BMP:   Recent Labs     08/14/20  1412 08/15/20  0149    144   K 3.4* 3.4*   CL 99 104   CO2 29 30   BUN 12 13   CREATININE 1.1 1.2     PT/INR: No results for input(s): PROTIME, INR in the last 72 hours. BNP:    Recent Labs     08/14/20  1412   PROBNP 2,589*     TROPONIN:   Recent Labs     08/14/20  1412 08/15/20  0149   TROPONINT <0.010 <0.010            Impression:  Principal Problem:    ICD (implantable cardioverter-defibrillator) discharge  Active Problems:    Gait disturbance, post-stroke    Acute congestive heart failure (HCC)    S/P ICD (internal cardiac defibrillator) procedure    Ischemic cardiomyopathy    ASCVD (arteriosclerotic cardiovascular disease)    H/O: CVA (cerebrovascular accident)  Resolved Problems:    * No resolved hospital problems. *       All labs, medications and tests reviewed by myself, continue all other medications of all above medical condition listed as is except for changes mentioned above. Thank you   Please call with questions.     Electronically signed by Elisha Bender JIMENEZ Guidry - CNP on 8/16/2020 at 10:08 AM   CARDIOLOGY ATTENDING ADDENDUM    I have seen ,spoken to  and examined this patient personally, independently of the nurse practitioner. I have reviewed the hospital care given to date and reviewed all pertinent labs and imaging. The plan was developed mutually at the time of the visit with the patient,  NP   and myself. I have spoken with patient, nursing staff and provided written and verbal instructions . The above note has been reviewed and I agree with the assessment, diagnosis, and treatment plan with changes made by me as follows     HPI:  I have reviewed the above HPI  And agree with above   Nic Ruiz is a 62 y. o.year old who and presents with had concerns including Pacemaker Problem (was sent my Arnoldo Das for EKG changes, says a week ago ICD went off). Chief Complaint   Patient presents with    Pacemaker Problem     was sent my Arnoldo Das for EKG changes, says a week ago ICD went off     Please review addendum/changes made to note above   Interval history:  H/o afib  But not taking anticoagulation, could nto afford meds     Physical Exam:  General:  Awake, alert, NAD  Head:normal  Eye:normal  Neck:  No JVD   Chest:  Clear to auscultation, respiration easy  Cardiovascular:  S1 and S2 audible, No added heart sounds, 1+ankle edema, or volume overload, No evidence of JVD, No crackles  Abdomen:   nontender  Extremities:  1 + edema  Pulses; palpable  Neuro: grossly normal      MEDICAL DECISION MAKING;    I agree with the above plan, which was planned by myself and discussed with NP.   Plan cardiac cath  Add coumadin after cath ( due to cost)   Continue lasix  Add lisinopril      Safia Sullivan MD [facility] 08/16/20

## 2020-08-16 NOTE — PROGRESS NOTES
Oral BID WC    PARoxetine  20 mg Oral QAM    lisinopril  5 mg Oral Daily      Infusions:   PRN Meds: sodium chloride flush, 10 mL, PRN  acetaminophen, 650 mg, Q6H PRN    Or  acetaminophen, 650 mg, Q6H PRN  polyethylene glycol, 17 g, Daily PRN  promethazine, 12.5 mg, Q6H PRN    Or  ondansetron, 4 mg, Q6H PRN          Electronically signed by Nicolas Olivo MD on 8/15/2020 at 10:58 PM

## 2020-08-16 NOTE — PLAN OF CARE
Problem: Cardiac Output - Decreased:  Goal: Hemodynamic stability will improve  Description: Hemodynamic stability will improve  Outcome: Ongoing     Problem: Fluid Volume:  Description: [TRUNCATED] Hyponatremia indicates a relatively greater amount of water to sodium in plasma. In hypovolemia, a deficit of both total body water and sodium exists but relatively less water deficit. Euvolemia represents near normal total body sodium co .. Collette Ruts [TRUNCATED] Hyponatremia indicates a relatively greater amount of water to sodium in plasma. In hypovolemia, a deficit of both total body water and sodium exists but relatively less water deficit. Euvolemia represents near normal total body sodium co ...   Goal: Hemodynamic stability will improve  Description: Hemodynamic stability will improve  Outcome: Ongoing  Goal: Ability to maintain a balanced intake and output will improve  Description: Ability to maintain a balanced intake and output will improve  Outcome: Ongoing     Problem: Health Behavior:  Goal: Identification of resources available to assist in meeting health care needs will improve  Description: Identification of resources available to assist in meeting health care needs will improve  Outcome: Ongoing     Problem: Respiratory:  Goal: Respiratory status will improve  Description: Respiratory status will improve  Outcome: Ongoing

## 2020-08-16 NOTE — PROGRESS NOTES
PHARMACY ANTICOAGULATION MONITORING SERVICE    Mejia Tamayo is a 62 y.o. male on warfarin therapy for PAF. Pharmacy consulted by Dr. Gracia Headley for monitoring and adjustment of treatment. Indication for anticoagulation: PAF  INR goal: 2-3  Warfarin dose prior to admission: N/A    Pertinent Laboratory Values   Recent Labs     08/14/20  1412 08/15/20  0149   HGB 15.6 13.2*   HCT 50.8 41.3*    167       Assessment/Plan:   Drug Interactions:  o Aspirin daily  o Enoxaparin bridge until INR therapeutic  o Acetaminophen PRN (daily doses >3g can increase INR)    INR is pending   Plan to initiate patient on warfarin 5 mg daily, pending INR   Additional dose adjustments to be made as appropriate per INR trends, interacting medications, and other clinical factors   Pharmacy will continue to monitor and adjust warfarin therapy as indicated    Thank you for the consult.   Linn Ledesma RPh  8/16/2020 4:31 PM

## 2020-08-17 ENCOUNTER — APPOINTMENT (OUTPATIENT)
Dept: NUCLEAR MEDICINE | Age: 55
DRG: 286 | End: 2020-08-17
Payer: MEDICARE

## 2020-08-17 VITALS
BODY MASS INDEX: 19.7 KG/M2 | OXYGEN SATURATION: 97 % | HEART RATE: 83 BPM | SYSTOLIC BLOOD PRESSURE: 100 MMHG | TEMPERATURE: 98.1 F | RESPIRATION RATE: 16 BRPM | DIASTOLIC BLOOD PRESSURE: 77 MMHG | WEIGHT: 125.5 LBS | HEIGHT: 67 IN

## 2020-08-17 PROBLEM — E44.0 MODERATE MALNUTRITION (HCC): Chronic | Status: ACTIVE | Noted: 2020-08-17

## 2020-08-17 LAB
CHOLESTEROL: 206 MG/DL
HDLC SERPL-MCNC: 35 MG/DL
INR BLD: 1.16 INDEX
LDL CHOLESTEROL DIRECT: 153 MG/DL
LV EF: 6 %
LV EF: 8 %
LVEF MODALITY: NORMAL
LVEF MODALITY: NORMAL
PROTHROMBIN TIME: 14.1 SECONDS (ref 11.7–14.5)
TRIGL SERPL-MCNC: 94 MG/DL

## 2020-08-17 PROCEDURE — 6360000002 HC RX W HCPCS: Performed by: INTERNAL MEDICINE

## 2020-08-17 PROCEDURE — 85610 PROTHROMBIN TIME: CPT

## 2020-08-17 PROCEDURE — 6370000000 HC RX 637 (ALT 250 FOR IP): Performed by: INTERNAL MEDICINE

## 2020-08-17 PROCEDURE — 78452 HT MUSCLE IMAGE SPECT MULT: CPT

## 2020-08-17 PROCEDURE — 80061 LIPID PANEL: CPT

## 2020-08-17 PROCEDURE — 36415 COLL VENOUS BLD VENIPUNCTURE: CPT

## 2020-08-17 PROCEDURE — 99233 SBSQ HOSP IP/OBS HIGH 50: CPT | Performed by: INTERNAL MEDICINE

## 2020-08-17 PROCEDURE — 6360000004 HC RX CONTRAST MEDICATION

## 2020-08-17 PROCEDURE — 93306 TTE W/DOPPLER COMPLETE: CPT

## 2020-08-17 PROCEDURE — A9500 TC99M SESTAMIBI: HCPCS | Performed by: INTERNAL MEDICINE

## 2020-08-17 PROCEDURE — 2140000000 HC CCU INTERMEDIATE R&B

## 2020-08-17 PROCEDURE — 93017 CV STRESS TEST TRACING ONLY: CPT

## 2020-08-17 PROCEDURE — 2580000003 HC RX 258: Performed by: INTERNAL MEDICINE

## 2020-08-17 PROCEDURE — 3430000000 HC RX DIAGNOSTIC RADIOPHARMACEUTICAL: Performed by: INTERNAL MEDICINE

## 2020-08-17 PROCEDURE — APPSS60 APP SPLIT SHARED TIME 46-60 MINUTES: Performed by: NURSE PRACTITIONER

## 2020-08-17 PROCEDURE — 94761 N-INVAS EAR/PLS OXIMETRY MLT: CPT

## 2020-08-17 PROCEDURE — 6370000000 HC RX 637 (ALT 250 FOR IP): Performed by: NURSE PRACTITIONER

## 2020-08-17 PROCEDURE — 83721 ASSAY OF BLOOD LIPOPROTEIN: CPT

## 2020-08-17 RX ORDER — FUROSEMIDE 40 MG/1
40 TABLET ORAL 2 TIMES DAILY
Qty: 60 TABLET | Refills: 0 | Status: SHIPPED | OUTPATIENT
Start: 2020-08-18 | End: 2020-10-02 | Stop reason: SDUPTHER

## 2020-08-17 RX ORDER — WARFARIN SODIUM 5 MG/1
TABLET ORAL
Qty: 30 TABLET | Refills: 0 | Status: SHIPPED | OUTPATIENT
Start: 2020-08-18 | End: 2020-08-17

## 2020-08-17 RX ORDER — LISINOPRIL 10 MG/1
10 TABLET ORAL DAILY
Status: DISCONTINUED | OUTPATIENT
Start: 2020-08-18 | End: 2020-08-18 | Stop reason: HOSPADM

## 2020-08-17 RX ORDER — SPIRONOLACTONE 25 MG/1
25 TABLET ORAL DAILY
Qty: 30 TABLET | Refills: 1 | Status: SHIPPED | OUTPATIENT
Start: 2020-08-18 | End: 2020-10-02 | Stop reason: SDUPTHER

## 2020-08-17 RX ORDER — WARFARIN SODIUM 5 MG/1
TABLET ORAL
Qty: 10 TABLET | Refills: 0 | Status: SHIPPED | OUTPATIENT
Start: 2020-08-18

## 2020-08-17 RX ORDER — AMIODARONE HYDROCHLORIDE 200 MG/1
200 TABLET ORAL DAILY
Qty: 30 TABLET | Refills: 0 | Status: SHIPPED | OUTPATIENT
Start: 2020-08-17 | End: 2020-10-02 | Stop reason: SDUPTHER

## 2020-08-17 RX ORDER — ATORVASTATIN CALCIUM 40 MG/1
40 TABLET, FILM COATED ORAL NIGHTLY
Qty: 30 TABLET | Refills: 0 | Status: SHIPPED | OUTPATIENT
Start: 2020-08-17 | End: 2020-10-02 | Stop reason: SDUPTHER

## 2020-08-17 RX ORDER — POTASSIUM CHLORIDE 20 MEQ/1
40 TABLET, EXTENDED RELEASE ORAL 2 TIMES DAILY WITH MEALS
Qty: 60 TABLET | Refills: 0 | Status: SHIPPED | OUTPATIENT
Start: 2020-08-18 | End: 2020-10-02 | Stop reason: SDUPTHER

## 2020-08-17 RX ORDER — FUROSEMIDE 40 MG/1
40 TABLET ORAL 2 TIMES DAILY
Status: DISCONTINUED | OUTPATIENT
Start: 2020-08-17 | End: 2020-08-18 | Stop reason: HOSPADM

## 2020-08-17 RX ORDER — LISINOPRIL 10 MG/1
10 TABLET ORAL DAILY
Qty: 30 TABLET | Refills: 1 | Status: SHIPPED | OUTPATIENT
Start: 2020-08-18 | End: 2020-10-02 | Stop reason: SDUPTHER

## 2020-08-17 RX ADMIN — WARFARIN SODIUM 5 MG: 5 TABLET ORAL at 17:30

## 2020-08-17 RX ADMIN — POTASSIUM CHLORIDE 40 MEQ: 1500 TABLET, EXTENDED RELEASE ORAL at 15:11

## 2020-08-17 RX ADMIN — Medication 10 MILLICURIE: at 07:40

## 2020-08-17 RX ADMIN — CARVEDILOL 6.25 MG: 6.25 TABLET, FILM COATED ORAL at 15:11

## 2020-08-17 RX ADMIN — FAMOTIDINE 20 MG: 20 TABLET ORAL at 15:12

## 2020-08-17 RX ADMIN — Medication 30 MILLICURIE: at 10:05

## 2020-08-17 RX ADMIN — SODIUM CHLORIDE, PRESERVATIVE FREE 10 ML: 5 INJECTION INTRAVENOUS at 10:03

## 2020-08-17 RX ADMIN — LISINOPRIL 5 MG: 5 TABLET ORAL at 15:11

## 2020-08-17 RX ADMIN — SODIUM CHLORIDE, PRESERVATIVE FREE 10 ML: 5 INJECTION INTRAVENOUS at 15:12

## 2020-08-17 RX ADMIN — PERFLUTREN 2 ML: 6.52 INJECTION, SUSPENSION INTRAVENOUS at 10:49

## 2020-08-17 RX ADMIN — SPIRONOLACTONE 25 MG: 25 TABLET ORAL at 15:21

## 2020-08-17 RX ADMIN — PAROXETINE HYDROCHLORIDE 20 MG: 20 TABLET, FILM COATED ORAL at 15:11

## 2020-08-17 RX ADMIN — SODIUM CHLORIDE, PRESERVATIVE FREE 10 ML: 5 INJECTION INTRAVENOUS at 15:21

## 2020-08-17 RX ADMIN — ASPIRIN 81 MG CHEWABLE TABLET 81 MG: 81 TABLET CHEWABLE at 15:12

## 2020-08-17 RX ADMIN — REGADENOSON 0.4 MG: 0.08 INJECTION, SOLUTION INTRAVENOUS at 10:04

## 2020-08-17 RX ADMIN — FUROSEMIDE 40 MG: 40 TABLET ORAL at 17:30

## 2020-08-17 ASSESSMENT — PAIN SCALES - GENERAL
PAINLEVEL_OUTOF10: 0
PAINLEVEL_OUTOF10: 0

## 2020-08-17 NOTE — PROGRESS NOTES
Comprehensive Nutrition Assessment    Type and Reason for Visit:  Positive Nutrition Screen    Nutrition Recommendations/Plan:   Will order frozen oral supplement (Magic Cup) BID  Encourage po intake as able  Will monitor po intake, nutrition status, poc    Nutrition Assessment:  Pt admitted for ICD discharge, PMH of CHF, Visited pt at bedside, pt reports UBW ~137#, pt reports gradual wt loss x last 6 mo, pt reports eating 1x daily, pt denies any chewing or swallowing difficulty, pt does have upper dentures, pt denies following any special diet PTA, NCM \"Heart Healthy Reduced Sodium Nutrition Therapy\" handout given and discussed, RD contact information given for further questions or concerns, pt is moderate nutrition risk at this time    Malnutrition Assessment:  Malnutrition Status: Moderate malnutrition    Context:  Chronic Illness     Findings of the 6 clinical characteristics of malnutrition:  Energy Intake:  Unable to assess  Weight Loss:  1 - Mild weight loss (specify amount and time period)(8.2% wt loss x 7 mo per chart review)     Body Fat Loss:  1 - Mild body fat loss Buccal region, Orbital   Muscle Mass Loss:  1 - Mild muscle mass loss Temples (temporalis), Clavicles (pectoralis & deltoids)  Fluid Accumulation:  Unable to assess     Strength:  Not Performed    Estimated Daily Nutrient Needs:  Energy (kcal):  9276-8328(25-30 kcal/kg ABW); Weight Used for Energy Requirements:  Current     Protein (g):  57-68(1.0-1.2 g/kg);   Weight Used for Protein Requirements:  Current        Nutrition Related Findings:  Cholesterol 206,       Wounds:  None([id number])       Current Nutrition Therapies:    DIET CARDIAC; Low Sodium (2 GM)    Anthropometric Measures:  · Height: 5' 7.01\" (170.2 cm)  · Current Body Weight: 125 lb 7.1 oz (56.9 kg)   · Admission Body Weight:      · Usual Body Weight: 136 lb 11 oz (62 kg)((1/27/20) per chart review)     · Ideal Body Weight: 148 lbs; % Ideal Body Weight 84.8 % · BMI: 19.6  · Adjusted Body Weight:  ; No Adjustment   · BMI Categories: Normal Weight (BMI 18.5-24. 9)       Nutrition Diagnosis:   · Food & Nutrition-related knowledge deficit related to cardiac dysfunction as evidenced by pt report of consuming high sodium foods    Nutrition Interventions:   Food and/or Nutrient Delivery:  Continue Current Diet, Start Oral Nutrition Supplement  Nutrition Education/Counseling:  Education completed   Coordination of Nutrition Care:  Continued Inpatient Monitoring    Goals:  pt will consume greater than 75% of meals and supplements       Nutrition Monitoring and Evaluation:       Food/Nutrient Intake Outcomes:  Diet Advancement/Tolerance, Supplement Intake, Food and Nutrient Intake  Physical Signs/Symptoms Outcomes:  Biochemical Data, Fluid Status or Edema, GI Status, Weight     Discharge Planning:     Too soon to determine     Electronically signed by Kee Burgos MS, RD, LD on 8/17/20 at 3:26 PM EDT    Contact: [phone]

## 2020-08-17 NOTE — PROGRESS NOTES
Hospitalist Progress Note      Name:  Tara Sanders /Age/Sex: 1965  (62 y.o. male)   MRN & CSN:  [id number] & [id number] Admission Date/Time: 2020  1:45 PM   [facility] PCP: Erica Cooper, [address] Day: 3    Assessment and Plan:   Tara Sanders is a 62 y.o.  male  who presents with:    ICD shock due to ventricular tachycardia and ventricular fibrillation  -Appreciate cardiology consult    Episodes of ventricular tachycardia and ventricular fibrillation  -Stress test planned to rule out reversible ischemia in the inferior wall. Coronary artery disease  -Angiogram on  showed 100% occluded RCA which fills distally from the left side, and a 60 to 70% diagonal lesion. Liana Long History of paroxysmal atrial fibrillation  -Warfarin started    Severe cardiomyopathy  -GDMT being optimized by cardiology    Acute on chronic systolic heart failure  -Furosemide 40 mg IV twice daily started August 15  -Spironolactone started August 15    Hypokalemia  -Replace and monitor BMP    Hypertension  -Amlodipine stopped by cardiology  -Continue carvedilol 6.25 mg twice daily  -Continue lisinopril 5 mg daily    Dyslipidemia-atorvastatin    Depression -continue Paxil    Social history- per cardiology note of  he was not taking his medications because he did not have prescription drug coverage. Subjective:     Patient denied any complaints when I saw him    Objective:        Intake/Output Summary (Last 24 hours) at 2020  Last data filed at 2020 1312  Gross per 24 hour   Intake --   Output 1000 ml   Net -1000 ml      Vitals:   Vitals:    20   BP:    Pulse: 87   Resp:    Temp:    SpO2:      Physical Exam:      Lungs-respiratory effort nonlabored the chest  Neurologic-speech clear  Skin-no cyanosis    Medications:   Medications:    sodium chloride flush  10 mL Intravenous 2 times per day    sodium chloride flush  10 mL Intravenous 2 times per day    warfarin  5 mg Oral Daily    furosemide  40 mg Intravenous BID    atorvastatin  40 mg Oral Nightly    spironolactone  25 mg Oral Daily    famotidine  20 mg Oral BID    enoxaparin  40 mg Subcutaneous Daily    potassium chloride  40 mEq Oral BID WC    aspirin  81 mg Oral Daily    carvedilol  6.25 mg Oral BID WC    PARoxetine  20 mg Oral QAM    lisinopril  5 mg Oral Daily      Infusions:    sodium chloride 50 mL/hr at 08/16/20 1049     PRN Meds: sodium chloride flush, 10 mL, PRN  LORazepam, 0.5 mg, Once PRN  sodium chloride flush, 10 mL, PRN  acetaminophen, 650 mg, Q4H PRN  hydrALAZINE, 10 mg, Q10 Min PRN  acetaminophen, 650 mg, Q6H PRN    Or  acetaminophen, 650 mg, Q6H PRN  polyethylene glycol, 17 g, Daily PRN  promethazine, 12.5 mg, Q6H PRN    Or  ondansetron, 4 mg, Q6H PRN          Electronically signed by Nicolas Olivo MD on 8/16/2020 at 9:35 PM

## 2020-08-17 NOTE — CARE COORDINATION
Chart reviewed:  Pt has PCP and insurance. LSW informed pt only has Medicare part Erik Combs will make referral to [facility]. Pt is independent of all ADLs. Pt lives with family. No other needs identified at this time.

## 2020-08-17 NOTE — PROGRESS NOTES
Cardiology Progress Note     Today's Plan: stress test     Admit Date:  8/14/2020    Consult reason/ Seen today for: VT, ICD activation     Subjective and  Overnight Events: Patient states that he is feeling much better today. Reviewed with patient left heart cath and stress test   Patient states understanding    Reviewed with patient need for compliance with medications. And encourage patient to stop smoking. Patient states that he can afford his medications except for Xarelto. Telemetry SR   No arhythmia noted    Assessment / Plan / Recommendation:     1. VT- s/p ICD activation-left heart cath yesterday showed RCA  with backfill from left and 60 to 70% in diagonal and 30 to 40% lesion in OM. Reviewed with patient lifestyle modifications and smoking cessation. 2. Acute on chronic combined  decompensated heart failure. Appears to be improved. Patient is able to lay flat. No shortness of breath or edema noted. Continue with IV diuresis. Please continue Gudelines recommended medical thearpy including b- blocker, ACEI and Aldactone daily. Daily weights, strict Is and O's   3. Severely depressed left ventricular systolic function:  EF 68-53%- Has ICD in place- on GDMT  4. Follow-up as outpatient will plan referral for possible advanced heart failure evaluation at [facility]  5. ASCVD- h/o anterior wall MI/ PCI wit KRYSTLE  x 2  to RCA (2016)- troponin negative. Having VT on ICD- continue with BB and statin  6. Tobacco abuse- encouraged to stop smoking    7. Dyslipidemia will check lipids - continue with statin   8. ? Atrial fib per ICD analysis -patient to start on Coumadin as he is not able to afford Xarelto. 9. Recommend case management to discuss with patient cost of medications. 10. DVT prophylaxis if not contraindicated while in the hospital.       History of Presenting Illness:    Chief complain on admission : 62 y. o.year old who is admitted for  Chief Complaint   Patient presents with    Pacemaker Problem     was sent my Mera Hugh for EKG changes, says a week ago ICD went off        Past medical history:    has a past medical history of Acute exacerbation of CHF (congestive heart failure) ([facility] [address] CAD (coronary artery disease), Cerebral artery occlusion with cerebral infarction [facility]), Chest pain, COPD (chronic obstructive pulmonary disease) ([facility] [address] Depression, H/O cardiovascular stress test, H/O Doppler ultrasound, H/O echocardiogram, H/O echocardiogram, H/O echocardiogram, H/O heart artery stent, H/O percutaneous left heart catheterization, Hyperlipidemia, Hypertension, PAF (paroxysmal atrial fibrillation) ([facility] [address] Post PTCA, STEMI (ST elevation myocardial infarction) ([facility] [address] Unstable angina ([facility] [address] and Vertigo. Past surgical history:   has a past surgical history that includes Cardiac catheterization (5/12/16); Coronary angioplasty with stent (5/12/16); and Cardiac defibrillator placement (Left, 11/22/2016). Social History:   reports that he has been smoking cigarettes. He has a 60.00 pack-year smoking history. He has never used smokeless tobacco. He reports current drug use. Frequency: 7.00 times per week. Drug: Marijuana. He reports that he does not drink alcohol. Family history:  family history includes High Blood Pressure in his father; Kidney Disease in his father. Allergies   Allergen Reactions    Doxycycline        Review of Systems:   All 14 systems were reviewed and are negative  Except for the positive findings which are documented     BP 91/73   Pulse 81   Temp 97.8 °F (36.6 °C) (Oral)   Resp 22   Ht 5' 7\" (1.702 m)   Wt 125 lb 8 oz (56.9 kg)   SpO2 95%   BMI 19.66 kg/m²       Intake/Output Summary (Last 24 hours) at 8/17/2020 1001  Last data filed at 8/16/2020 1312  Gross per 24 hour   Intake --   Output 750 ml   Net -750 ml       Physical Exam  Vitals signs reviewed.    Constitutional:       General: He is not in acute distress. Appearance: Normal appearance. He is not ill-appearing. Eyes:      Conjunctiva/sclera: Conjunctivae normal.      Pupils: Pupils are equal, round, and reactive to light. Neck:      Musculoskeletal: Neck supple. No muscular tenderness. Vascular: No carotid bruit. Cardiovascular:      Rate and Rhythm: Normal rate and regular rhythm. Pulses: Normal pulses. Heart sounds: Normal heart sounds. No murmur. Pulmonary:      Effort: Pulmonary effort is normal. No respiratory distress. Breath sounds: Examination of the right-lower field reveals decreased breath sounds. Examination of the left-lower field reveals decreased breath sounds. Decreased breath sounds present. Musculoskeletal:         General: No swelling or deformity. Skin:     General: Skin is warm and dry. Capillary Refill: Capillary refill takes less than 2 seconds. Neurological:      Mental Status: He is alert and oriented to person, place, and time. Psychiatric:         Mood and Affect: Mood normal.         Behavior: Behavior normal.         Thought Content:  Thought content normal.         Judgment: Judgment normal.          Medications:    perflutren lipid microspheres        sodium chloride flush  10 mL Intravenous 2 times per day    sodium chloride flush  10 mL Intravenous 2 times per day    warfarin  5 mg Oral Daily    furosemide  40 mg Intravenous BID    atorvastatin  40 mg Oral Nightly    spironolactone  25 mg Oral Daily    famotidine  20 mg Oral BID    enoxaparin  40 mg Subcutaneous Daily    potassium chloride  40 mEq Oral BID WC    aspirin  81 mg Oral Daily    carvedilol  6.25 mg Oral BID WC    PARoxetine  20 mg Oral QAM    lisinopril  5 mg Oral Daily      sodium chloride 50 mL/hr at 08/16/20 1049     perflutren lipid microspheres, technetium sestamibi, technetium sestamibi, sodium chloride flush, sodium chloride flush, acetaminophen, hydrALAZINE, acetaminophen **OR** acetaminophen, polyethylene glycol, promethazine **OR** ondansetron    Lab Data:  CBC:   Recent Labs     08/14/20  1412 08/15/20  0149   WBC 6.7 6.6   HGB 15.6 13.2*   HCT 50.8 41.3*   MCV 90.4 89.6    167     BMP:   Recent Labs     08/14/20  1412 08/15/20  0149    144   K 3.4* 3.4*   CL 99 104   CO2 29 30   BUN 12 13   CREATININE 1.1 1.2     PT/INR:   Recent Labs     08/16/20  1712 08/17/20  0604   PROTIME 13.8 14.1   INR 1.14 1.16     BNP:    Recent Labs     08/14/20  1412   PROBNP 2,589*     TROPONIN:   Recent Labs     08/14/20  1412 08/15/20  0149   TROPONINT <0.010 <0.010            Impression:  Principal Problem:    ICD (implantable cardioverter-defibrillator) discharge  Active Problems:    Gait disturbance, post-stroke    Acute congestive heart failure (HCC)    S/P ICD (internal cardiac defibrillator) procedure    Ischemic cardiomyopathy    ASCVD (arteriosclerotic cardiovascular disease)    H/O: CVA (cerebrovascular accident)  Resolved Problems:    * No resolved hospital problems. *       All labs, medications and tests reviewed by myself, continue all other medications of all above medical condition listed as is except for changes mentioned above. Thank you   Please call with questions. Electronically signed by JIMENEZ Rhoades CNP on 8/17/2020 at 10:01 AM   CARDIOLOGY ATTENDING ADDENDUM    I have seen ,spoken to  and examined this patient personally, independently of the nurse practitioner. I have reviewed the hospital care given to date and reviewed all pertinent labs and imaging. The plan was developed mutually at the time of the visit with the patient,  NP   and myself. I have spoken with patient, nursing staff and provided written and verbal instructions . The above note has been reviewed and I agree with the assessment, diagnosis, and treatment plan with changes made by me as follows     HPI:  I have reviewed the above HPI  And agree with above   Derick Howard is a 62 y. o.year old who and presents with had concerns including Pacemaker Problem (was sent lillie Laughlin for EKG changes, says a week ago ICD went off). Chief Complaint   Patient presents with    Pacemaker Problem     was sent lillie Laughlin for EKG changes, says a week ago ICD went off     Please review addendum/changes made to note above   Interval history: Stress test shows dilated cardiomyopathy with infarcted inferior and anterior wall. EF is significantly reduced at 5%    Physical Exam:  General:  Awake, alert, NAD  Head:normal  Eye:normal  Neck:  No JVD   Chest:  Clear to auscultation, respiration easy  Cardiovascular:  S1 and S2 audible, No added heart sounds, No signs of ankle edema, or volume overload, No evidence of JVD, No crackles  Abdomen:   nontender  Extremities:  No  edema  Pulses; palpable  Neuro: grossly normal      MEDICAL DECISION MAKING;    I agree with the above plan, which was planned by myself and discussed with NP. Start Coumadin will need lifelong anticoagulation  Will need outpatient follow-up for referral for possible advanced heart failure clinic  Compliance urged  Ideally would like to give Entresto but he cannot afford medication therefore will continue lisinopril and carvedilol titrate up add Aldactone switch to p.o.  Lasix  We will sign off        Porfirio Shone , MD [facility] 08/17/20

## 2020-08-17 NOTE — PLAN OF CARE
Problem: Cardiac Output - Decreased:  Goal: Hemodynamic stability will improve  Description: Hemodynamic stability will improve  Outcome: Ongoing     Problem: Fluid Volume:  Goal: Hemodynamic stability will improve  Description: Hemodynamic stability will improve  Outcome: Ongoing  Goal: Ability to maintain a balanced intake and output will improve  Description: Ability to maintain a balanced intake and output will improve  Outcome: Ongoing     Problem: Health Behavior:  Goal: Identification of resources available to assist in meeting health care needs will improve  Description: Identification of resources available to assist in meeting health care needs will improve  Outcome: Ongoing     Problem: Respiratory:  Goal: Respiratory status will improve  Description: Respiratory status will improve  Outcome: Ongoing

## 2020-08-17 NOTE — PROGRESS NOTES
PHARMACY ANTICOAGULATION MONITORING SERVICE    Ulysses Cali is a 62 y.o. male on warfarin therapy for PAF. Pharmacy consulted by Dr. Lili Paris for monitoring and adjustment of treatment. Indication for anticoagulation: PAF  INR goal: 2-3  Warfarin dose prior to admission: N/A    Pertinent Laboratory Values   Recent Labs     08/15/20  0149  08/17/20  0604   INR  --    < > 1.16   HGB 13.2*  --   --    HCT 41.3*  --   --      --   --     < > = values in this interval not displayed. INR MONITORING  Recent Labs     08/16/20  1712 08/17/20  0604   INR 1.14 1.16       Assessment/Plan:   Drug Interactions:  o Aspirin daily  o Enoxaparin until INR therapeutic (doses currently not being given)  o Acetaminophen PRN (daily doses >3g can increase INR)    INR remains very low @1.16.  First warfarin dose given last night. Will hope to see the effects of the dose tomorrow am.   Continue with warfarin 5mg daily for now   Additional dose adjustments to be made as appropriate per INR trends, interacting medications, and other clinical factors   Pharmacy will continue to monitor and adjust warfarin therapy as indicated    Thank you for the consult. Geeta Lind  8/17/2020 3:54 PM

## 2020-08-18 NOTE — PROGRESS NOTES
furosemide  40 mg Oral BID    [START ON 8/18/2020] lisinopril  10 mg Oral Daily    sodium chloride flush  10 mL Intravenous 2 times per day    sodium chloride flush  10 mL Intravenous 2 times per day    warfarin  5 mg Oral Daily    atorvastatin  40 mg Oral Nightly    spironolactone  25 mg Oral Daily    enoxaparin  40 mg Subcutaneous Daily    potassium chloride  40 mEq Oral BID WC    aspirin  81 mg Oral Daily    carvedilol  6.25 mg Oral BID WC    PARoxetine  20 mg Oral QAM      Infusions:    sodium chloride 50 mL/hr at 08/16/20 1049     PRN Meds: perflutren lipid microspheres, 2 mL, ONCE PRN  sodium chloride flush, 10 mL, PRN  sodium chloride flush, 10 mL, PRN  acetaminophen, 650 mg, Q4H PRN  hydrALAZINE, 10 mg, Q10 Min PRN  acetaminophen, 650 mg, Q6H PRN    Or  acetaminophen, 650 mg, Q6H PRN  polyethylene glycol, 17 g, Daily PRN  promethazine, 12.5 mg, Q6H PRN    Or  ondansetron, 4 mg, Q6H PRN          Electronically signed by Angie Tineo MD on 8/17/2020 at 11:43 PM

## 2020-08-20 ENCOUNTER — TELEPHONE (OUTPATIENT)
Dept: PHARMACY | Age: 55
End: 2020-08-20

## 2020-08-20 PROBLEM — I48.0 PAROXYSMAL A-FIB (HCC): Status: ACTIVE | Noted: 2020-08-20

## 2020-08-21 ENCOUNTER — HOSPITAL ENCOUNTER (OUTPATIENT)
Age: 55
Discharge: HOME | End: 2020-08-21

## 2020-08-21 ENCOUNTER — ANTI-COAG VISIT (OUTPATIENT)
Dept: PHARMACY | Age: 55
End: 2020-08-21

## 2020-08-21 LAB
INR BLD: 4.28 INDEX
PROTHROMBIN TIME: 52.6 SECONDS (ref 11.7–14.5)

## 2020-08-21 PROCEDURE — 85610 PROTHROMBIN TIME: CPT

## 2020-08-21 PROCEDURE — 36415 COLL VENOUS BLD VENIPUNCTURE: CPT

## 2020-08-21 PROCEDURE — 99212 OFFICE O/P EST SF 10 MIN: CPT

## 2020-08-21 NOTE — PROGRESS NOTES
Medication Management Service  [address] [phone]    Visit Date: 8/21/2020   Subjective: All appointments have been changed to telephone visits at this time due to COVID-19. Kyle Hernandez is a 62 y.o. male who is having INR checked by [facility]. INR results were sent to the clinic for anticoagulation monitoring and adjustment. Patient results called in to clinic for warfarin management due to  Indication:   atrial fibrillation. INR goal: of 2.0-3.0. Duration of therapy: indefinite. Patient reports the following:   Adherent with regimen  Missed or extra doses:  None   Bleeding or thromboembolic side effects:  None  Significant medication changes:  None  Significant dietary changes: None  Significant alcohol or tobacco changes: None  Significant recent illness, disease state changes, or hospitalization:  None  Upcoming surgeries or procedures:  None  Falls: None           Assessment and Plan     PT/INR performed by Lab. INR today is 4.28, supratherapeutic. Patient started warfarin 5mg on 8/16 and was discharged 8/17 from [address] [place] [address]. Patient has been taking warfarin in the morning. Patient already took dose on Friday and Saturday. Plan:  Hold warfarin Sunday and Monday then decrease weekly dose by ~36% to warfarin 2.5mg daily except 5mg on Mondays and Thursdays. Recheck INR Monday 8/24. Patient has standing lab orders for PT/INR. Patient verbalized understanding of dosing directions and information discussed. Progress note sent to referring office. Patient acknowledges working in consult agreement with pharmacist as referred by his/her physician. Patient accepted that for purposes of billing, this is a virtual visit with your provider for which we will submit a claim for reimbursement with your insurance company.  You may be responsible for any copays, coinsurance amounts or other amounts not covered by your insurance company.      Electronically signed by Jairo Zelaya, [address] [place] [address] on 8/21/20 at 5:19 PM EDT    CLINICAL PHARMACY CONSULT: MED RECONCILIATION/REVIEW Pennie  22. Tracking Only    PHSO: No  Total # of Interventions Recommended: 1  - Decreased Dose #: 1  - Maintenance Safety Lab Monitoring #: 1  Total Interventions Accepted: 1  Time Spent (min): Paulette DuranD

## 2020-08-21 NOTE — DISCHARGE SUMMARY
Discharge Summary    Name:  Loistine Blizzard /Age/Sex: 1965  (62 y.o. male)   MRN & CSN:  [id number] & [id number] Admission Date/Time: 2020  1:45 PM   Attending:  No att. providers found Discharging Physician: Daysi Garces MD     HPI:     Per H&P:  Chief Complaint: AICD shock     Loistine Blizzard is a 62 y.o.  male  who presents with the above complaints, onset last week. Patient has hx of CHF, had 2 shocks from AICD last Friday. Followed with Dr Lavinia Alvarado, who recommended ED visit due to NSVT on pacer interrogation. By report, Dr Lavinia Alvarado s/w Dr Ike Pepper about need for possible cath. Patient denies current/recent chest pain/pressure, sob, back pain, diaphoresis. No AICD d/c since last Friday. He reports otherwise being in baseline health status. Denies regular alcohol use, illicit drug use. Hospital Course:     Katiana Patel is a pleasant 61-year-old who had a STEMI in 2016. He had a PCI of the RCA at that time. His ejection fraction was 20% at that time. His ejection fraction did not recover and he eventually had a dual-chamber ICD placed in late . For this admission he presented from his electrophysiologist's office -  Dr. Lavinia Alvarado -who was sent him to the ED after an outpatient visit with a complaint that his defibrillator had fired 7 days prior to the visit. At that visit it was found that he had new ischemic changes on his EKG, and interrogation of the pacer showed ventricular fibrillation and ventricular tachycardia. He was admitted. While here he had a cardiac catheterization by Dr. Ike Pepper which showed a 100% RCA occlusion. A stress test was done after the angiogram and it showed a severe large inferior, and severe large septal defect with no reversible ischemia. An echocardiogram done this admission. showed that his ejection fraction is now down to as low as 5 to 10% with moderate mitral valve regurgitation present.   This compared to an ejection fraction of 15 to 20% 3 years earlier. I spoke with Dr. Moriah Lino the day of discharge who indicated that patient will need outpatient follow-up for possible advanced heart failure referral.  Ideally he would like to give him Entresto, but he cannot afford the medication as he has no prescription drug coverage. Dr. Moriah Lino mentioned to me that referral for a heart transplant evaluation might be a possibility. The patient was encouraged to quit smoking. He was eager to leave the hospital today, so he was discharged home in stable condition. He was here for a total of 3 days as an inpatient. Please note that the patient was discharged on August 17, not August 16. So the date of face-to-face service of this note should be August 17, however, that  EHR would not allow me to enter that date as the EHR indicates  that he was discharged on August 16, which is an error that I cannot correct and I cannot override the EHR and enter the correct date of service. I left a VM about this at medical records.      Problem list    Ventricular fibrillation and ventricular tachycardia resulting in ICD activation  -I spoke to Dr. Moriah Lino the day of discharge who recommended sending the patient home on amiodarone 200 mg daily - a new medication for him  -Patient needs follow up for amiodarone monitoring    Coronary artery disease status post STEMI in 2016 and now with 100% RCA occlusion  -Continue aspirin I may have the help desk please    Acute on chronic combined systolic and diastolic decompensated heart failure  -Continue Coreg 6.25 mg BID which he stated he was taking  -He was taken off of Lotrel 5/20 mg by cardiology and started on lisinopril 10 mg daily by cardiology  -Lasix dose was increased from 20 mg BID to 40 mg BID  -Spironolactone was added by cardiology this admission  -Potassium has been added  -Recommend follow up BMP soon    Severe left ventricular dysfunction status post ICD   -GDMT optimized as above    History of paroxysmal atrial fibrillation  -He used to be on Xarelto in the past, but was no longer taking it because he does not have prescription drug coverage -per record review the last time rivaroxaban was filled and covered by insurance was in July 2018  -Coumadin was started this admission at cardiology's recommendation - needs follow up INR later this week  -Watch for warfarin/amiodarone interaction as both medications prescribed upon discharge at cardiology's recommendation  -I only gave him a 10 day supply of warfarin - as I wasn't sure if he would follow up and check his INR as he stated he only has Medicare part A which only covers inpatient services - he was referred to the financial counselor by CM    Tobacco use disorder-still smokes    Dyslipidemia  -Lipitor 40 mg HS was added this admission - he was not taking a statin at home    The patient expressed appropriate understanding of and agreement with the discharge recommendations, medications, and plan.      Consults this admission:  IP CONSULT TO ELECTROPHYSIOLOGY  IP CONSULT TO CARDIOLOGY  IP CONSULT TO HOSPITALIST  IP CONSULT TO CARDIOLOGY  IP CONSULT TO PHARMACY    Discharge Instruction:   Follow up appointments: cardiology and EP  Primary care physician:  within 2 weeks    Diet:  cardiac diet   Activity: activity as tolerated  Disposition: Discharged to:   [x]Home, [][facility], []SNF, []Acute Rehab, []Hospice   Condition on discharge: Stable    Discharge Medications:      Alexander Ivan   Home Medication Instructions [facility]:[id number]    Printed on:08/20/20 2018   Medication Information                      amiodarone (CORDARONE) 200 MG tablet  Take 1 tablet by mouth daily             aspirin 81 MG chewable tablet  Take 1 tablet by mouth daily             atorvastatin (LIPITOR) 40 MG tablet  Take 1 tablet by mouth nightly             carvedilol (COREG) 6.25 MG tablet  TAKE 1 TABLET BY MOUTH 2 TIMES DAILY (WITH MEALS)             furosemide (LASIX) 40 MG tablet  Take 1 tablet by Beatriz MENA   Date of study        08/17/2020       Date of Birth     1965         Gender               Male       Age               62 year(s)         Race                        Patient Number    [id number]         Room Number          3525       Visit Number      [id number]          Height               67 inches       Corporate ID      [id number]           Weight               129 pounds       Accession Number  [id number]                                            NM Technologist      Fannie Bowen [facility]                                                              Lexi Al, [facility]       Deanna          Jaylyn Hiwot Darya 7    Physician         MD                 Cardiologist         MD       Conclusions       Summary    ECG portion of stress test is negative for ischemia by diagnostic criteria. PVC and NSVT after Infusion    Dilated Ischemic cardiomyopathy with Significantly reduced EF of 6%    Severe large inferior , severe large septal and large Apical defect    No reversibility or ischemia seen    Abnormal stress images       Recommendation    Risk modification    Medical management       Signatures       ------------------------------------------------------------------    Electronically signed by Alvin Story MD (Interpreting    cardiologist) on 08/17/2020 at 15:06    ------------------------------------------------------------------      Procedure   Procedure Type:       Nuclear Stress Test:Pharmacological, Myocardial Perfusion Imaging with    Pharm, NM MYOCARDIAL SPECT REST EXERCISE OR RX      Indications: Chest pain. Risk Factors       The patient risk factors include:prior PCI;Current - Every day tobacco use,    hypertension, chronic lung disease, last creatinine: 1.2 mg/dl, dyslipidemia    and creatinine clearance: 56.89 ml/min. Stress Protocols       Resting ECG    Normal sinus rhythm. RBBB       Resting HR:78 bpm  Resting BP:114/81 mmHg      Stress Protocol:Pharmacologic - Lexiscan      Peak HR:103 bpm                              HR/BP product:25861    Peak BP:123/80 mmHg    Predicted HR: 164 bpm    % of predicted HR: 63       Exercise duration: 01:00 min    Reason for termination:Completed       ECG Findings    Normal sinus rhythm. Arrhythmias    PVC and NSVT       Symptoms    Shortness of breath. Stress Interpretation    ECG portion of stress test is negative for ischemia by diagnostic criteria. PVC and NSVT after Infusion      Procedure Medications     - Lexiscan I.V. bolus (over 15sec.) 0.4 mg admininstered @ 08/17/2020 10:05. Imaging Protocols       Rest                             Stress       Isotope:Sestamibi 99mTc          Isotope: Sestamibi 99mTc    Isotope dose:10.8 mCi            Isotope dose:31.8 mCi    Administration route: I.V. Administration route: I.V. Injection Date:08/17/2020 07:40  Injection Date:08/17/2020 10:05    Scan Date:08/17/2020 08:25       Scan Date:08/17/2020 10:50       Technique:        SPECT          Technique:        Gated                                                       SPECT       Procedure Description       Upon patient arrival, the patient is identified using two identifiers and    the physician order is verified. An IV is established and 8-11mCi of 99mTc    Sestamibi is intravenously injected and followed with 10mL 0.9% Normal    Saline flush. A circulation period of 45 minutes occurs prior to resting    SPECT imaging. After imaging is complete the patient is escorted to the    stress lab. The patient is connected to the ECG and blood pressure is    measured. The RN starts the stress portion of the exam and rapidly    intravenously injects Lexiscan (regadenosine) 0.4mg over a period of 10 to15    seconds and follows with 5mL 0.9% Normal Saline flush.  Immediately following    the Nuclear Technologist intravenously injects 22-33mCi of 99mTc Sestamibi    and 5mL 0.9% Normal Saline flush. After completion, recovery, and removal of    the IV, the patient rests during the second circulation period of 45    minutes. Final stress SPECT gated imaging is performed. The patient may    return home or to their room after stress imaging. The images are processed    and final charting is completed and sent to the appropriate cardiologist for    interpretation and reporting. Perfusion Interpretation       Dilated Ischemic cardiomyopathy with Significantly reduced EF of 6%    Severe large inferior , severe large septal and large Apical defect    No reversibility or ischemia seen      Imaging Results        Summed scores         - Summed stress score: 49         - Summed rest score: 49         - Summed difference score:      0       Rest ejection    Ejection fraction:6 %    EDV :615 ml    ESV :580 ml    Stroke volume :35 ml      Medical History       Accession#:  [id number]      Admission Data   Admission date: 08/14/2020 Admission Time: 13:45     Insurance payors: Medicare. Hospital Status: Inpatient. NM MYOCARDIAL SPECT REST EXERCISE OR RX [[id number]]       Order Status: Canceled       XR CHEST PORTABLE [[id number]]  Collected: 08/14/20 1431      Order Status: Completed  Updated: 08/14/20 1435      Narrative:       EXAMINATION:   ONE XRAY VIEW OF THE CHEST     8/14/2020 1:59 pm     COMPARISON:   09/15/2017     HISTORY:   ORDERING SYSTEM PROVIDED HISTORY: icd   TECHNOLOGIST PROVIDED HISTORY:   Reason for exam:->icd   Reason for Exam: chest pain / pacer issue   Acuity: Acute   Type of Exam: Initial     FINDINGS:   Cardiac AICD is stable in positioning. Cardiomegaly with pulmonary vascular   congestion. No pleural effusion or pneumothorax. No other focal   consolidation. Calcified granuloma in the right lung base. Osseous   structures are stable. Diffuse osteopenia. Impression:       Cardiomegaly with pulmonary vascular congestion.   Stable positioning of cardiac AICD.           Discharge Time of 45 minutes    Electronically signed by Thu Kowalski MD on 8/20/2020 at 8:18 PM

## 2020-08-22 PROCEDURE — 99212 OFFICE O/P EST SF 10 MIN: CPT

## 2020-08-24 ENCOUNTER — ANTI-COAG VISIT (OUTPATIENT)
Dept: PHARMACY | Age: 55
End: 2020-08-24

## 2020-08-24 ENCOUNTER — HOSPITAL ENCOUNTER (OUTPATIENT)
Age: 55
Discharge: HOME | End: 2020-08-24

## 2020-08-24 VITALS — TEMPERATURE: 97.8 F

## 2020-08-24 LAB
INTERNATIONAL NORMALIZATION RATIO, POC: 5.1
POC INR: 5.1 INDEX
PROTHROMBIN TIME, POC: 60.7 SECONDS (ref 10–14.3)

## 2020-08-24 PROCEDURE — 99213 OFFICE O/P EST LOW 20 MIN: CPT

## 2020-08-24 PROCEDURE — 36416 COLLJ CAPILLARY BLOOD SPEC: CPT

## 2020-08-24 PROCEDURE — 85610 PROTHROMBIN TIME: CPT

## 2020-08-24 NOTE — PROGRESS NOTES
Medication Management Service  [facility]  [phone]    Visit Date: 8/24/2020   Subjective:       Sudeep Watkins is a 60 y.o. male who presents to clinic today for anticoagulation monitoring and adjustment. Patient seen in clinic for warfarin management due to  Indication:   atrial fibrillation. INR goal: of 2.0-3.0. Duration of therapy: indefinite. Patient reports the following:   Adherent with regimen  Missed or extra doses:  None   Bleeding or thromboembolic side effects:  None  Significant medication changes:  None  Significant dietary changes: None  Significant alcohol or tobacco changes: None  Significant recent illness, disease state changes, or hospitalization:  None  Upcoming surgeries or procedures:  None  Falls: None           Assessment and Plan     PT/INR done in office per protocol. INR today is 5.1, supratherapeutic, despite 2 held doses. INR continued to rise after INR of 4.28 on 8/21. Plan:  Hold warfarin until INR in range. .     Recheck INR in 3 days. Patient states he doesn't have his glasses today and can't see any of the written material.   Patient is new to clinic. Patient provided written education pamphlet and verbal counseling regarding warfarin's mechanism of action, compliance in taking medication as instructed, PT/INR monitoring, follow up with healthcare provider, side effects, drug and food reactions and interactions and dietary advice. Patient verbalized understanding of dosing directions and information discussed. Dosing schedule given to patient including phone number, appointment date, and time. Progress note sent to referring office. *Patient left patient education packet in office- will provide to patient at next appointment. Patient acknowledges working in consult agreement with pharmacist as referred by his/her physician. *Provided patient phone number for financial counselor at [facility].    Electronically signed by Monica Pang Sherly Houston [facility] on 8/24/20 at 1:34 PM EDT

## 2020-08-27 ENCOUNTER — ANTI-COAG VISIT (OUTPATIENT)
Dept: PHARMACY | Age: 55
End: 2020-08-27

## 2020-08-27 VITALS — TEMPERATURE: 96.6 F

## 2020-08-27 LAB
INTERNATIONAL NORMALIZATION RATIO, POC: 4.6
POC INR: 4.6 INDEX
PROTHROMBIN TIME, POC: 55.2 SECONDS (ref 10–14.3)

## 2020-08-27 PROCEDURE — 99212 OFFICE O/P EST SF 10 MIN: CPT

## 2020-08-27 PROCEDURE — 36416 COLLJ CAPILLARY BLOOD SPEC: CPT

## 2020-08-27 PROCEDURE — 85610 PROTHROMBIN TIME: CPT

## 2020-08-27 NOTE — PROGRESS NOTES
Medication Management Service  [facility]  [phone]    Visit Date: 8/27/2020   Subjective:       Cassidy Brown is a 60 y.o. male who presents to clinic today for anticoagulation monitoring and adjustment. Patient seen in clinic for warfarin management due to  Indication:   atrial fibrillation and CVA. INR goal: of 2.0-3.0. Duration of therapy: indefinite. Patient reports the following:   Adherent with regimen  Missed or extra doses:  None   Bleeding or thromboembolic side effects:  None  Significant medication changes:  None  Significant dietary changes: None  Significant alcohol or tobacco changes: None  Significant recent illness, disease state changes, or hospitalization:  None  Upcoming surgeries or procedures:  None  Falls: None           Assessment and Plan     PT/INR done in office per protocol. INR today is 4.6, supratherapeutic despite warfarin held for past 4 days. Plan: Will continue to hold warfarin until INR in range. .     Recheck INR in 4 days. Patient verbalized understanding of dosing directions and information discussed. Dosing schedule given to patient including phone number, appointment date, and time. Progress note sent to referring office. Patient acknowledges working in consult agreement with pharmacist as referred by his/her physician.       Electronically signed by Fransisca Gardner, [address] [place] [address] on 8/27/20 at 10:00 AM EDT    CLINICAL PHARMACY CONSULT: MED RECONCILIATION/REVIEW Pennie  22. Tracking Only    PHSO: No  Total # of Interventions Recommended: 1  - Decreased Dose #: 1  - Maintenance Safety Lab Monitoring #: 1  Total Interventions Accepted: 1  Time Spent (min): Jeanette Joseph PharmD

## 2020-08-31 ENCOUNTER — TELEPHONE (OUTPATIENT)
Dept: PHARMACY | Age: 55
End: 2020-08-31

## 2020-08-31 NOTE — TELEPHONE ENCOUNTER
No show to appointment. Called patient. No answer and no voicemail. Last 2 INR supratherapeutic. INR MONITORING  INR   Date Value Ref Range Status   08/27/2020 4.6  Final   08/24/2020 5.1  Final   08/21/2020 4.28 INDEX Final     Comment:     THERAPEUTIC RANGE:  INDICATIONS: INR 2.0-3.0  Most (DVT, PE,  Atrial Fibillation, Bioprosthetic valve,   [place] Judes bicuspid aortic valve)  INDICATIONS: 2.5-3.5 Most mechanical valves  recurrent thrombosis. 08/17/2020 1.16 INDEX Final     Comment:     THERAPEUTIC RANGE:  INDICATIONS: INR 2.0-3.0  Most (DVT, PE,  Atrial Fibillation, Bioprosthetic valve,   [place] Judes bicuspid aortic valve)  INDICATIONS: 2.5-3.5 Most mechanical valves  recurrent thrombosis. 08/16/2020 1.14 INDEX Final     Comment:     THERAPEUTIC RANGE:  INDICATIONS: INR 2.0-3.0  Most (DVT, PE,  Atrial Fibillation, Bioprosthetic valve,   [place] Judes bicuspid aortic valve)  INDICATIONS: 2.5-3.5 Most mechanical valves  recurrent thrombosis.      09/15/2017 1.02 INDEX Final   11/21/2016 1.02 INDEX Final     POC INR   Date Value Ref Range Status   08/27/2020 4.6 INDEX Final     Comment:     (NOTE)  Indications:                                                INR  Most (DVT,PE,Atrial Fibrillation, Bioprosthetic valve,    2.0-3.0  [place] Judes bicuspid aortic valve)  Most mechanical valves, recurrent thrombosis              2.5-3.5     08/24/2020 5.1 INDEX Final     Comment:     (NOTE)  Indications:                                                INR  Most (DVT,PE,Atrial Fibrillation, Bioprosthetic valve,    2.0-3.0  [place] Judes bicuspid aortic valve)  Most mechanical valves, recurrent thrombosis              2.5-3.5       CLINICAL PHARMACY CONSULT: MED RECONCILIATION/REVIEW ADDENDUM    For Pharmacy Admin Tracking Only    PHSO: No  Total # of Interventions Recommended: 0  Total Interventions Accepted: 0  Time Spent (min): 5    John Botello, PauletteD

## 2020-09-01 ENCOUNTER — OFFICE VISIT (OUTPATIENT)
Dept: CARDIOLOGY CLINIC | Age: 55
End: 2020-09-01

## 2020-09-01 VITALS
HEIGHT: 67 IN | SYSTOLIC BLOOD PRESSURE: 102 MMHG | WEIGHT: 118.4 LBS | HEART RATE: 80 BPM | DIASTOLIC BLOOD PRESSURE: 68 MMHG | BODY MASS INDEX: 18.58 KG/M2

## 2020-09-01 PROCEDURE — 99213 OFFICE O/P EST LOW 20 MIN: CPT | Performed by: NURSE PRACTITIONER

## 2020-09-01 ASSESSMENT — ENCOUNTER SYMPTOMS
HEARTBURN: 0
POOR WOUND HEALING: 0
SHORTNESS OF BREATH: 0
ABDOMINAL PAIN: 0
HOARSE VOICE: 0
EYE PAIN: 0
COLOR CHANGE: 0

## 2020-09-01 NOTE — PROGRESS NOTES
Catherine [address], [address] [facility],Third [address]  Phone: [phone]    Fax [phone]                  Lizeth Ross MD, Sherrilyn Sacks, MD, [address] [facility], MD, MD Ilene Abdullahi MD Roselind Less, MD Chang Almodovar, APRN               Pahala Her, APRN    Merry Alexander, APRN              Kavya Evangelista, APRN            9/1/2020    RE: Clay De Los Santos  (1965)                               TO:  Dr. Guille Lovell MD  The primary cardiologist is Dr. Valeri Luong    CC:   1. ASCVD (arteriosclerotic cardiovascular disease)    2. Ischemic cardiomyopathy    3. Paroxysmal A-fib ([address]    4. Essential hypertension         HPI:    Thank you for involving me in taking care of your patient Clay De Los Santos. Clay De Los Santos is a 60y.o. year old male with a history as listed above and is being seen in the office today. Liam Nice was recently seen in [facility] for discharge of ICD. He was noted to have frequent episodes of VT and V. Fib. He was taken for left heart catheterization to rule out ischemia as cause. He was noted to have 90% of the RCA. Distal RCA is feeling from left side. LAD stent was noted to be patent. Diagonal has 67% lesion noted. August 17, 2020 he did have stress test he also at that time. Stress test showed severe large inferior. Severe large septal and large apical defect. There was no ischemia. Today Mr. Celeste Burnette states that he is feeling fatigued. Denies any chest pain or shortness of breath. States that he just has no energy. He notes also that his appetite has decreased. He is not taking in much by mouth as he states he just does not feel hungry.            Current Outpatient Medications   Medication Sig Dispense Refill    atorvastatin (LIPITOR) 40 MG tablet Take 1 tablet by mouth nightly 30 tablet 0    spironolactone (ALDACTONE) 25 MG tablet Take 1 tablet by mouth daily 30 tablet 1    lisinopril (PRINIVIL;ZESTRIL) 10 MG tablet Take 1 tablet by mouth daily 30 tablet 1    furosemide (LASIX) 40 MG tablet Take 1 tablet by mouth 2 times daily 60 tablet 0    potassium chloride (KLOR-CON M) 20 MEQ extended release tablet Take 2 tablets by mouth 2 times daily (with meals) 60 tablet 0    warfarin (COUMADIN) 5 MG tablet Take once a day 10 tablet 0    amiodarone (CORDARONE) 200 MG tablet Take 1 tablet by mouth daily 30 tablet 0    carvedilol (COREG) 6.25 MG tablet TAKE 1 TABLET BY MOUTH 2 TIMES DAILY (WITH MEALS) 60 tablet 3    aspirin 81 MG chewable tablet Take 1 tablet by mouth daily (Patient taking differently: Take 81 mg by mouth 2 times daily ) 30 tablet 3    PARoxetine (PAXIL) 20 MG tablet Take 20 mg by mouth every morning        No current facility-administered medications for this visit. Allergies: Doxycycline  Past Medical History:   Diagnosis Date    Acute exacerbation of CHF (congestive heart failure) ([address] [address] 9/20/2016    CAD (coronary artery disease)     Cerebral artery occlusion with cerebral infarction ([facility] [facility]     Chest pain     COPD (chronic obstructive pulmonary disease) ([facility]     Depression     H/O cardiovascular stress test 5/2/2016    treadmill    H/O Doppler ultrasound 10/14/2016    This  Carotid  ultrasound  is   normal    H/O echocardiogram 4/6/16    EF 25%,     H/O echocardiogram 09/22/2016    EF20%. Dilated cardiomyopathy with severely reduced LV systolic function. Minimal Aortic Stenosis.  Paced TR     H/O echocardiogram 07/27/2017    EF 15-20% Mild to mod TR    H/O heart artery stent 5/12/16    successful PTCA and PCI of RCA with 2.5x20mm balloon and then KRYSTLE 2.5X38MM stent     H/O percutaneous left heart catheterization 5/12/16    right coronary artery is a dominant vessel with 85% long proximal to mid segment stenosis    Hyperlipidemia     Hypertension     PAF (paroxysmal atrial fibrillation) ([facility]     Post PTCA 4/6/16    EF 25%, Stented: LAD &Ostial DX , RCA has 80% Mid segment stenosis, LG anterior wall aneurysm     STEMI (ST elevation myocardial infarction) ([facility] 4/6/16    Unstable angina (HCC)     Vertigo      Past Surgical History:   Procedure Laterality Date    CARDIAC CATHETERIZATION  5/12/16    right coronary artery is a dominant vessel with 85% long proximal to mid segment stenosis    CARDIAC DEFIBRILLATOR PLACEMENT Left 11/22/2016    CORONARY ANGIOPLASTY WITH STENT PLACEMENT  5/12/16    successful PTCA and PCI of RCA with 2.5x20mm balloon and then KRYSTLE 2.5X38MM stent      Family History   Problem Relation Age of Onset    Kidney Disease Father     High Blood Pressure Father      Social History     Tobacco Use    Smoking status: Current Every Day Smoker     Packs/day: 1.50     Years: 40.00     Pack years: 60.00     Types: Cigarettes    Smokeless tobacco: Never Used    Tobacco comment: declined counseling   Substance Use Topics    Alcohol use: No     Alcohol/week: 0.0 standard drinks     Comment: Caffeine Intake: 7- 8 Mountain Dew cans a day        Review of Systems   Constitution: Positive for decreased appetite and malaise/fatigue. Negative for diaphoresis. HENT: Negative for congestion and hoarse voice. Eyes: Negative for pain and visual disturbance. Cardiovascular: Negative for chest pain, dyspnea on exertion, irregular heartbeat, near-syncope, palpitations and paroxysmal nocturnal dyspnea. Respiratory: Negative for shortness of breath. Endocrine: Negative for cold intolerance and heat intolerance. Hematologic/Lymphatic: Negative for adenopathy and bleeding problem. Skin: Negative for color change, poor wound healing and rash. Musculoskeletal: Negative for arthritis and muscle weakness. Gastrointestinal: Negative for abdominal pain, heartburn and melena. Genitourinary: Negative for flank pain and hematuria. Neurological: Negative for dizziness and light-headedness. Psychiatric/Behavioral: Negative for depression.  The patient is not 104 08/15/2020    CO2 30 08/15/2020    BUN 13 08/15/2020    CREATININE 1.2 08/15/2020    GFRAA >60 08/15/2020    LABGLOM >60 08/15/2020    GLUCOSE 89 08/15/2020    CALCIUM 8.7 08/15/2020     Hepatic Function Panel:    Lab Results   Component Value Date    ALKPHOS 130 08/14/2020    ALT 6 08/14/2020    AST 11 08/14/2020    PROT 7.8 08/14/2020    BILITOT 0.4 08/14/2020    LABALBU 4.0 08/14/2020     Magnesium:    Lab Results   Component Value Date    MG 2.2 08/15/2020     PT/INR:    Lab Results   Component Value Date    PROTIME 55.2 08/27/2020    INR 4.6 08/27/2020    INR 4.6 08/27/2020    INR 4.28 08/21/2020     Lipids:    Lab Results   Component Value Date    TRIG 94 08/17/2020    HDL 35 08/17/2020    LDLDIRECT 153 08/17/2020     Lab Results   Component Value Date    LABA1C 6.0 03/09/2016     TSH:  No results found for: TSH    Left heart catheterization 08/2020   Procedure Summary   Indication : VT / iced fire   Access : Radial   1. RCA is 100 % occluded it fills in distal segment from left   side   2. LAD stent is patent, Diag has 60-70 % lesion   3. Circ is large calibre, OM is large it has an angular take off   and 30-40 % lesion   4. LVEDP was 20 mmHG    echocardiogram  Left ventricular systolic function is severely reduced. Ejection fraction is visually estimated at 5-10%. Spontaneous echo contrast (\"smoke\") visualized within the left ventricle due   to low velocity blood flow/blood stasis. Severely Dilated left ventricle. ICD wiring visualized within the right heart. No evidence of any pericardial effusion. Moderate to Severe mitral regurgitation is present with eccentric anterior   jet    Assessment/ Plan:    Patient seen, interviewed and examined. Testing was reviewed. 1. ASCVD (arteriosclerotic cardiovascular disease)  Has a history of having stent to LAD. Most recent heart cath shows RCA is on a percent occluded and fills its distal segment from the left side. LAD stent appears to be patent. Diagonal has 67%  Lesion  Recent stress test demonstrates severe large inferior, severe large septal and large apical defect. There is no reversibility or ischemia seen. We will optimize medications at this time  He is not a candidate for intervention    2. Ischemic cardiomyopathy  He has an AICD in place  LVSF severely reduced at 5 to 10%   We will continue with carvedilol, lisinopril, Aldactone  Patient does not have insurance so we will work to see if we can change ACE over to [facility]-medication for his recently adjusted hospital so we will continue present medications at this time- will ask Briana to see if [facility]   Denies any further therapies delivered from ICD. Concern for cardiac cachexia as patient has decreased appetite and has not really eaten over the last 2 to 3 days. I having encouraged patient to increase protein even using small snacks throughout the day. Patient is able to use protein drinks as meal supplementation as well    Has a history of having V. tach/V. Fib-has been seen by electrophysiology. Patient was started on amiodarone      3. Paroxysmal A-fib (HCC)  Noted to have possible atrial flutter per his ICD analysis  Recommend anticoagulation  He is on amiodarone. Patient having episodes of elevated INR most likely secondary to use of amiodarone. Patient is following with outpatient Coumadin clinic    4. Essential hypertension  Blood pressure is stable no change in therapy          Lifestyle and risk factor modificatons discussed. Various goals are discussed and questions answered. Continue current medications. Appropriate prescriptions are addressed. Questions answered and patient verbalizes understanding.    follow up in 1 months

## 2020-09-01 NOTE — LETTER
Dereck Burnette  1965  [id number]    Have you had any Chest Pain - No  I    Have you had any Shortness of Breath - Yes  If Yes - When on exertion    Have you had any dizziness - No      Have you had any palpitations - No        Do you have any edema - swelling in None        Do you have a surgery or procedure scheduled in the near future - No      Ask patient [facility] if [facility] are not already [facility]    Check to see if we have an E-MAIL on file for the patient    Check medication list thoroughly!!!  BE SURE TO ASK PATIENT IF THEY NEED MEDICATION REFILLS

## 2020-09-03 ENCOUNTER — TELEPHONE (OUTPATIENT)
Dept: PHARMACY | Age: 55
End: 2020-09-03

## 2020-09-03 ENCOUNTER — ANTI-COAG VISIT (OUTPATIENT)
Dept: PHARMACY | Age: 55
End: 2020-09-03

## 2020-09-03 VITALS — TEMPERATURE: 96.9 F

## 2020-09-03 LAB
INTERNATIONAL NORMALIZATION RATIO, POC: 1.3
POC INR: 1.3 INDEX
PROTHROMBIN TIME, POC: 15.4 SECONDS (ref 10–14.3)

## 2020-09-03 PROCEDURE — 99212 OFFICE O/P EST SF 10 MIN: CPT

## 2020-09-03 PROCEDURE — 36416 COLLJ CAPILLARY BLOOD SPEC: CPT

## 2020-09-03 PROCEDURE — 85610 PROTHROMBIN TIME: CPT

## 2020-09-03 NOTE — PROGRESS NOTES
Medication Management Service  [facility]  [phone]    Visit Date: 9/3/2020   Subjective:       Mony Fraire is a 60 y.o. male who presents to clinic today for anticoagulation monitoring and adjustment. Patient seen in clinic for warfarin management due to  Indication:   atrial fibrillation. INR goal: of 2.0-3.0. Duration of therapy: indefinite. Patient reports the following:   Adherent with regimen  Missed or extra doses:  None   Bleeding or thromboembolic side effects:  None  Significant medication changes:  None  Significant dietary changes: None  Significant alcohol or tobacco changes: None  Significant recent illness, disease state changes, or hospitalization:  None  Upcoming surgeries or procedures:  None  Falls: None           Assessment and Plan     PT/INR done in office per protocol. INR today is 1.3, subtherapeutic. Patient's last dose of warfarin was 8/22. Patient missed appointment 8/27 and continued to hold warfarin. Will restart warfarin today. Patient complains of extreme tiredness and pain. Advised patient to call PCP. Greg preferred if new prescription needed. .   Plan: Restart warfarin at warfarin 2.5mg daily. Recheck INR in 5 days. Clinic and labs closed Monday for Labor day. Patient will go to [facility] for walk-in INR on Tuesday 9/8. Patient verbalized understanding of dosing directions and information discussed. Dosing schedule given to patient including phone number, appointment date, and time. Progress note sent to referring office. Patient acknowledges working in consult agreement with pharmacist as referred by his/her physician.       Electronically signed by Jesus Kat [address] [place] [address] on 9/3/20 at 2:31 PM EDT    CLINICAL PHARMACY CONSULT: MED RECONCILIATION/REVIEW ADDENDUM    For Pharmacy Admin Tracking Only    PHSO: No  Total # of Interventions Recommended: 0  - Decreased Dose #: 1  - Maintenance Safety Lab Monitoring #: 1     Total Interventions Accepted: 1  Time Spent (min): [address], PharmD

## 2020-09-04 ENCOUNTER — TELEPHONE (OUTPATIENT)
Dept: CARDIOLOGY CLINIC | Age: 55
End: 2020-09-04

## 2020-09-04 NOTE — TELEPHONE ENCOUNTER
Scheduled for same day.      CLINICAL PHARMACY CONSULT: MED RECONCILIATION/REVIEW ADDENDUM    For Pharmacy Admin Tracking Only    PHSO: No  Total # of Interventions Recommended: 0  Total Interventions Accepted: 0  Time Spent (min): 5    Ismael Andrade PharmD

## 2020-09-04 NOTE — TELEPHONE ENCOUNTER
Patient advised to stop in the office to sign patient assistance forms for [employer] as Reg Mahmood NP would like to start the patient on this if he qualifies for patient assistance. Forms placed at the . Patient states that he will stop in the office 9/8/20.

## 2020-09-08 ENCOUNTER — TELEPHONE (OUTPATIENT)
Dept: PHARMACY | Age: 55
End: 2020-09-08

## 2020-09-08 NOTE — TELEPHONE ENCOUNTER
Patient's sister [facility] left VM stating patient unable to have INR check today due to not feeling up to it. Called back. Rescheduled for tomorrow per her request. Notified her that results would not be called to patient until Thursday. She voiced understanding.     CLINICAL PHARMACY CONSULT: MED RECONCILIATION/REVIEW ADDENDUM    For Pharmacy Admin Tracking Only    PHSO: No  Total # of Interventions Recommended: 0  Total Interventions Accepted: 0  Time Spent (min): 5    Gretta Eric, PharmD

## 2020-09-09 ENCOUNTER — HOSPITAL ENCOUNTER (OUTPATIENT)
Age: 55
Discharge: HOME | End: 2020-09-09

## 2020-09-09 LAB
INR BLD: 1.02 INDEX
PROTHROMBIN TIME: 12.3 SECONDS (ref 11.7–14.5)

## 2020-09-09 PROCEDURE — 36415 COLL VENOUS BLD VENIPUNCTURE: CPT

## 2020-09-09 PROCEDURE — 85610 PROTHROMBIN TIME: CPT

## 2020-09-09 NOTE — TELEPHONE ENCOUNTER
Patient assistance forms complete, scanned into [employer] and faxed to [facility] Awaiting response.

## 2020-09-10 ENCOUNTER — ANTI-COAG VISIT (OUTPATIENT)
Dept: PHARMACY | Age: 55
End: 2020-09-10

## 2020-09-10 PROCEDURE — 99212 OFFICE O/P EST SF 10 MIN: CPT

## 2020-09-10 PROCEDURE — 36416 COLLJ CAPILLARY BLOOD SPEC: CPT

## 2020-09-10 NOTE — PROGRESS NOTES
Medication Management Service  [address] [phone]  Anna alvarez [phone]    Visit Date: 9/10/2020   Subjective: All appointments have been changed to telephone visits at this time due to COVID-19. Alma Delia Lennon is a 60 y.o. male who is having INR checked by [facility]. INR results were sent to the clinic for anticoagulation monitoring and adjustment. Patient results called in to clinic for warfarin management due to  Indication:   atrial fibrillation and CVA. INR goal: of 2.0-3.0. Duration of therapy: indefinite. Patient reports the following:   Adherent with regimen  Missed or extra doses:  None   Bleeding or thromboembolic side effects:  None  Significant medication changes:  None  Significant dietary changes: None  Significant alcohol or tobacco changes: None  Significant recent illness, disease state changes, or hospitalization:  None  Upcoming surgeries or procedures:  None  Falls: None           Assessment and Plan     PT/INR performed by Lab. INR 9/9/20 was 1.02, subtherapeutic. Patient denies missed doses. Plan: Take warfarin 5mg daily for 2 days then increase weekly dose ~29% to warfarin 2.5mg daily except 5mg on Mondays and Thursdays. Recheck INR in 3 days. Patient has standing lab orders for PT/INR. Patient verbalized understanding of dosing directions and information discussed. Progress note sent to referring office. Patient acknowledges working in consult agreement with pharmacist as referred by his/her physician. Patient accepted that for purposes of billing, this is a virtual visit with your provider for which we will submit a claim for reimbursement with your insurance company. You may be responsible for any copays, coinsurance amounts or other amounts not covered by your insurance company.      Electronically signed by Bethany Gilman [facility] on 9/10/20 at 9:34 AM EDT    CLINICAL PHARMACY CONSULT: RASHAAD RECONCILIATION/REVIEW ADDENDUM    For Pharmacy Admin Tracking Only    PHSO: No  Total # of Interventions Recommended: 1  - Increased Dose #: 1  - Maintenance Safety Lab Monitoring #: 1  Total Interventions Accepted: 1  Time Spent (min): 15    Mccarty Level, PharmD

## 2020-09-14 ENCOUNTER — ANTI-COAG VISIT (OUTPATIENT)
Dept: PHARMACY | Age: 55
End: 2020-09-14

## 2020-09-14 VITALS — TEMPERATURE: 97.1 F

## 2020-09-14 LAB
INTERNATIONAL NORMALIZATION RATIO, POC: 2.7
POC INR: 2.7 INDEX
PROTHROMBIN TIME, POC: 32.1 SECONDS (ref 10–14.3)

## 2020-09-14 PROCEDURE — 99211 OFF/OP EST MAY X REQ PHY/QHP: CPT

## 2020-09-14 PROCEDURE — 85610 PROTHROMBIN TIME: CPT

## 2020-09-14 PROCEDURE — 36416 COLLJ CAPILLARY BLOOD SPEC: CPT

## 2020-09-14 NOTE — PROGRESS NOTES
Medication Management Service  [facility]  [phone]    Visit Date: 9/14/2020   Subjective:       Gaby Marcum is a 60 y.o. male who presents to clinic today for anticoagulation monitoring and adjustment. Patient seen in clinic for warfarin management due to  Indication:   atrial fibrillation and CVA. INR goal: of 2.0-3.0. Duration of therapy: indefinite. Patient reports the following:   Adherent with regimen  Missed or extra doses:  Missed yesterday  Bleeding or thromboembolic side effects:  None  Significant medication changes:  None  Significant dietary changes: started Ensure a few weeks ago, but will change to Boost today with about 1 per day  Significant alcohol or tobacco changes: None  Significant recent illness, disease state changes, or hospitalization:  None  Upcoming surgeries or procedures:  None  Falls: None           Assessment and Plan     PT/INR done in office per protocol. INR today is 2.7, therapeutic. INR rising quickly, but patient missed dose yesterday, which is not yet reflescted in today's result. Discussed importance of consistent intake of Boost/Ensure due to vitamin K content of either. Plan: Will continue current regimen of warfarin 2.5mg daily except 5mg on Mondays and Thursdays. Recheck INR in 1 week(s). Patient verbalized understanding of dosing directions and information discussed. Dosing schedule given to patient including phone number, appointment date, and time. Progress note sent to referring office. Patient acknowledges working in consult agreement with pharmacist as referred by his/her physician.       Electronically signed by Miguel Ángel Betancourt [address] [place] [address] on 9/14/20 at 8:19 AM EDT    CLINICAL PHARMACY CONSULT: MED RECONCILIATION/REVIEW Pennie  22. Tracking Only    PHSO: No  Total # of Interventions Recommended: 0    - Maintenance Safety Lab Monitoring #: 1    Total Interventions Accepted: 0  Time Spent (min):

## 2020-09-16 NOTE — TELEPHONE ENCOUNTER
Called to check the status of the patient assistance application. Per [facility], they will need two additional months of documentation of patients pension. Attempted to call patient and the phone advised that the patient is unavailable and to try again later. Will call patient later.

## 2020-09-21 ENCOUNTER — TELEPHONE (OUTPATIENT)
Dept: PHARMACY | Age: 55
End: 2020-09-21

## 2020-09-21 NOTE — TELEPHONE ENCOUNTER
No show to appointment. Called patient. No answer and no voicemail.      CLINICAL PHARMACY CONSULT: MED RECONCILIATION/REVIEW ADDENDUM    For Pharmacy Admin Tracking Only    PHSO: No  Total # of Interventions Recommended: 0    Total Interventions Accepted: 0  Time Spent (min): 5    Anthony Chan, PharmD

## 2020-09-22 ENCOUNTER — TELEPHONE (OUTPATIENT)
Dept: CARDIOLOGY CLINIC | Age: 55
End: 2020-09-22

## 2020-09-22 NOTE — TELEPHONE ENCOUNTER
Family called very concerned about him   He is not eating , didn't go to have protime  Done , He has no energy they are very   Concerned , No family on HIPPA

## 2020-09-23 NOTE — TELEPHONE ENCOUNTER
Attempted to call patient and the phone advised that the patient is unavailable and to try again later. Will try again 9/24/20.

## 2020-09-28 ENCOUNTER — TELEPHONE (OUTPATIENT)
Dept: PHARMACY | Age: 55
End: 2020-09-28

## 2020-09-28 NOTE — TELEPHONE ENCOUNTER
Called to schedule. No answer and no voicemail.      CLINICAL PHARMACY CONSULT: MED RECONCILIATION/REVIEW ADDENDUM    For Pharmacy Admin Tracking Only    PHSO: No  Total # of Interventions Recommended: 0    Total Interventions Accepted: 0  Time Spent (min): 5    Marquetta Harada, PharmD

## 2020-09-29 NOTE — TELEPHONE ENCOUNTER
Attempted to call patient and the phone advised that the patient is unavailable and to try again later. Will await return phone call from patient.

## 2020-10-01 ENCOUNTER — TELEPHONE (OUTPATIENT)
Dept: PHARMACY | Age: 55
End: 2020-10-01

## 2020-10-01 NOTE — TELEPHONE ENCOUNTER
Called to schedule appointment. No answer and no voicemail.      CLINICAL PHARMACY CONSULT: MED RECONCILIATION/REVIEW ADDENDUM    For Pharmacy Admin Tracking Only    PHSO: No  Total # of Interventions Recommended: 0    Total Interventions Accepted: 0  Time Spent (min): 5    Giovanna Fregoso, PauletteD

## 2020-10-02 ENCOUNTER — OFFICE VISIT (OUTPATIENT)
Dept: CARDIOLOGY CLINIC | Age: 55
End: 2020-10-02

## 2020-10-02 VITALS
HEIGHT: 67 IN | BODY MASS INDEX: 18.27 KG/M2 | SYSTOLIC BLOOD PRESSURE: 90 MMHG | WEIGHT: 116.4 LBS | HEART RATE: 64 BPM | DIASTOLIC BLOOD PRESSURE: 58 MMHG

## 2020-10-02 PROCEDURE — 99214 OFFICE O/P EST MOD 30 MIN: CPT | Performed by: INTERNAL MEDICINE

## 2020-10-02 RX ORDER — SPIRONOLACTONE 25 MG/1
25 TABLET ORAL DAILY
Qty: 30 TABLET | Refills: 5 | Status: SHIPPED | OUTPATIENT
Start: 2020-10-02

## 2020-10-02 RX ORDER — CARVEDILOL 6.25 MG/1
6.25 TABLET ORAL 2 TIMES DAILY WITH MEALS
Qty: 60 TABLET | Refills: 5 | Status: SHIPPED | OUTPATIENT
Start: 2020-10-02

## 2020-10-02 RX ORDER — LISINOPRIL 10 MG/1
10 TABLET ORAL DAILY
Qty: 30 TABLET | Refills: 5 | Status: SHIPPED | OUTPATIENT
Start: 2020-10-02

## 2020-10-02 RX ORDER — FUROSEMIDE 40 MG/1
40 TABLET ORAL 2 TIMES DAILY
Qty: 60 TABLET | Refills: 5 | Status: SHIPPED | OUTPATIENT
Start: 2020-10-02

## 2020-10-02 RX ORDER — POTASSIUM CHLORIDE 20 MEQ/1
40 TABLET, EXTENDED RELEASE ORAL 2 TIMES DAILY WITH MEALS
Qty: 60 TABLET | Refills: 5 | Status: SHIPPED | OUTPATIENT
Start: 2020-10-02

## 2020-10-02 RX ORDER — AMIODARONE HYDROCHLORIDE 200 MG/1
200 TABLET ORAL DAILY
Qty: 30 TABLET | Refills: 5 | Status: SHIPPED | OUTPATIENT
Start: 2020-10-02

## 2020-10-02 RX ORDER — ATORVASTATIN CALCIUM 40 MG/1
40 TABLET, FILM COATED ORAL NIGHTLY
Qty: 30 TABLET | Refills: 5 | Status: SHIPPED | OUTPATIENT
Start: 2020-10-02

## 2020-10-02 NOTE — PROGRESS NOTES
Ankit Berrios MD        OFFICE  FOLLOWUP NOTE    Chief complaints: patient is here for management of CAD, HTN, AFIB, DYSLPIDEMIA, CVA, ICD, VT/VF      Post hosptial discharge for vfib shock and [facility], started on amiodarone and coumadin    Subjective: patient feels tired, no chest pain, no shortness of breath, no dizziness, no palpitations    HPI Anastasiya Burgess is a 60 y. o.year old who  has a past medical history of Acute exacerbation of CHF (congestive heart failure) ([facility] [address] CAD (coronary artery disease), Cerebral artery occlusion with cerebral infarction [facility]), Chest pain, COPD (chronic obstructive pulmonary disease) ([facility] Depression, H/O cardiovascular stress test, H/O Doppler ultrasound, H/O echocardiogram, H/O echocardiogram, H/O echocardiogram, H/O heart artery stent, H/O percutaneous left heart catheterization, Hyperlipidemia, Hypertension, PAF (paroxysmal atrial fibrillation) ([facility] Post PTCA, STEMI (ST elevation myocardial infarction) ([facility] Unstable angina ([facility] and Vertigo.  and presents for management of CAD, HTN, AFIB, DYSLPIDEMIA, CVA, ICD, VT/VF which are well controlled      Current Outpatient Medications   Medication Sig Dispense Refill    atorvastatin (LIPITOR) 40 MG tablet Take 1 tablet by mouth nightly 30 tablet 0    spironolactone (ALDACTONE) 25 MG tablet Take 1 tablet by mouth daily 30 tablet 1    lisinopril (PRINIVIL;ZESTRIL) 10 MG tablet Take 1 tablet by mouth daily 30 tablet 1    furosemide (LASIX) 40 MG tablet Take 1 tablet by mouth 2 times daily 60 tablet 0    potassium chloride (KLOR-CON M) 20 MEQ extended release tablet Take 2 tablets by mouth 2 times daily (with meals) 60 tablet 0    warfarin (COUMADIN) 5 MG tablet Take once a day (Patient not taking: Reported on 9/1/2020) 10 tablet 0    amiodarone (CORDARONE) 200 MG tablet Take 1 tablet by mouth daily 30 tablet 0    carvedilol (COREG) 6.25 MG tablet TAKE 1 TABLET BY MOUTH 2 TIMES DAILY (WITH MEALS) 60 tablet 3    aspirin 81 MG chewable tablet Take 1 tablet by mouth daily (Patient taking differently: Take 81 mg by mouth 2 times daily ) 30 tablet 3    PARoxetine (PAXIL) 20 MG tablet Take 20 mg by mouth every morning        No current facility-administered medications for this visit. Allergies: Doxycycline  Past Medical History:   Diagnosis Date    Acute exacerbation of CHF (congestive heart failure) ([facility] [address] 9/20/2016    CAD (coronary artery disease)     Cerebral artery occlusion with cerebral infarction ([facility] [address]     Chest pain     COPD (chronic obstructive pulmonary disease) ([address]     Depression     H/O cardiovascular stress test 5/2/2016    treadmill    H/O Doppler ultrasound 10/14/2016    This  Carotid  ultrasound  is   normal    H/O echocardiogram 4/6/16    EF 25%,     H/O echocardiogram 09/22/2016    EF20%. Dilated cardiomyopathy with severely reduced LV systolic function. Minimal Aortic Stenosis.  Paced TR     H/O echocardiogram 07/27/2017    EF 15-20% Mild to mod TR    H/O heart artery stent 5/12/16    successful PTCA and PCI of RCA with 2.5x20mm balloon and then KRYSTLE 2.5X38MM stent     H/O percutaneous left heart catheterization 5/12/16    right coronary artery is a dominant vessel with 85% long proximal to mid segment stenosis    Hyperlipidemia     Hypertension     PAF (paroxysmal atrial fibrillation) ([facility] [address]     Post PTCA 4/6/16    EF 25%, Stented: LAD &Ostial DX , RCA has 80% Mid segment stenosis, LG anterior wall aneurysm     STEMI (ST elevation myocardial infarction) ([facility] [address] 4/6/16    Unstable angina (HCC)     Vertigo      Past Surgical History:   Procedure Laterality Date    CARDIAC CATHETERIZATION  5/12/16    right coronary artery is a dominant vessel with 85% long proximal to mid segment stenosis    CARDIAC DEFIBRILLATOR PLACEMENT Left 11/22/2016    CORONARY ANGIOPLASTY WITH STENT PLACEMENT  5/12/16    successful PTCA and PCI of RCA with 2.5x20mm balloon and then KRYSTLE 2.5X38MM stent      Family History   Problem Relation Age of Onset    Kidney Disease Father     High Blood Pressure Father      Social History     Tobacco Use    Smoking status: Former Smoker     Packs/day: 1.50     Years: 40.00     Pack years: 60.00     Types: Cigarettes    Smokeless tobacco: Never Used   Substance Use Topics    Alcohol use: No     Alcohol/week: 0.0 standard drinks     Comment: Caffeine Intake: 7- 8 Mountain Dew cans a day      [unfilled]  Review of Systems:   · Constitutional: No Fever or Weight Loss   · Eyes: No Decreased Vision  · ENT: No Headaches, Hearing Loss or Vertigo  · Cardiovascular: No chest pain, dyspnea on exertion, palpitations or loss of consciousness  · Respiratory: No cough or wheezing    · Gastrointestinal: No abdominal pain, appetite loss, blood in stools, constipation, diarrhea or heartburn  · Genitourinary: No dysuria, trouble voiding, or hematuria  · Musculoskeletal:  No gait disturbance, weakness or joint complaints  · Integumentary: No rash or pruritis  · Neurological: No TIA or stroke symptoms  · Psychiatric: No anxiety or depression  · Endocrine: No malaise, fatigue or temperature intolerance  · Hematologic/Lymphatic: No bleeding problems, blood clots or swollen lymph nodes  · Allergic/Immunologic: No nasal congestion or hives  All systems negative except as marked. Objective:  BP (!) 90/58 (Site: Right Upper Arm, Position: Sitting, Cuff Size: Medium Adult)   Pulse 64   Ht 5' 7\" (1.702 m)   Wt 116 lb 6.4 oz (52.8 kg)   BMI 18.23 kg/m²   Wt Readings from Last 3 Encounters:   10/02/20 116 lb 6.4 oz (52.8 kg)   09/01/20 118 lb 6.4 oz (53.7 kg)   08/17/20 125 lb 8 oz (56.9 kg)     Body mass index is 18.23 kg/m².   GENERAL - Alert, oriented, pleasant, in no apparent distress,normal grooming  HEENT - pupils are reactive to light and accomodation, cornea intact, conjunctive normal color, ears are normal in exam,throat exam in normal, teeth, gum and palate are normal, oral mucosa is normal without any notation of pallor or cyanosis  Neck - Supple. No jugular venous distention noted. No carotid bruits, no apical -carotid delay  Respiratory:  Normal breath sounds, No respiratory distress, No wheezing, No chest tenderness. ,no use of accessory muscles, diaphragm movement is normal  Cardiovascular: (PMI) apex non displaced,no lifts no thrills, no s3,no s4, Normal heart rate, Normal rhythm, No murmurs, No rubs, No gallops. Carotid arteries pulse and amplitude are normal no bruit, no abdominal bruit noted ( normal abdominal aorta ausculation), femoral arteries pulse and amplitude are normal no bruit, pedal pulses are normal  Femoral pulses have normal amplitude, no bruits   Extremities - No cyanosis, clubbing, or significant edema, no varicose veins    Abdomen - No masses, tenderness, or organomegaly, no hepato-splenomegally, no bruits  Musculoskeletal - No significant edema, no kyphosis or scoliosis, no deformity in any extremity noted, muscle strength and tone are normal  Skin: no ulcer,no scar,no stasis dermatitis   Neurologic - alert oriented times 3,Cranial nerves II through XII are grossly intact. There were no gross focal neurologic abnormalities. All sensory and motor nerves examined and were normal  Psychiatric: normal mood and affect    Lab Results   Component Value Date    CKTOTAL 132 09/15/2017     BNP:  No results found for: BNP  PT/INR:  No results found for: [employer]  Lab Results   Component Value Date    LABA1C 6.0 03/09/2016     Lab Results   Component Value Date    CHOL 206 (H) 08/17/2020    TRIG 94 08/17/2020    HDL 35 (L) 08/17/2020    LDLDIRECT 153 (H) 08/17/2020     Lab Results   Component Value Date    ALT 6 (L) 08/14/2020    AST 11 (L) 08/14/2020     TSH:  No results found for: TSH    Impression:  Jennifer Yang is a 60 y. o.year old who  has a past medical history of Acute exacerbation of CHF (congestive heart failure) ([address] CAD (coronary artery disease), Cerebral artery occlusion with cerebral infarction [facility]), Chest pain, COPD (chronic obstructive pulmonary disease) ([facility] [address] Depression, H/O cardiovascular stress test, H/O Doppler ultrasound, H/O echocardiogram, H/O echocardiogram, H/O echocardiogram, H/O heart artery stent, H/O percutaneous left heart catheterization, Hyperlipidemia, Hypertension, PAF (paroxysmal atrial fibrillation) ([facility] [address] Post PTCA, [facility] ([facility]) [facility] and Vertigo. and presents with     Plan:  1. CAD: had [facility] in hospital, RCA is , LAD stent is patent, diagonal 60-70% om1 HAS 30-40% STENOSIS, LVEF is 5-10% severe native CAD,Continue aspirin,continue statins, betablockers  2. CARDIOMYOPATHY: s/p ICD,last evauation at 8/26/20 HAD some vifb and had ATP and shock delivered, was admitted to [address] [place] [address] and had [facility] as mentioned above and started on amdioarone  3. VTACH/VFIB: back on amiodarone  4. PAF:  continue coumadin and amdioarone  5. Dyslipidemia: continue statin  6. HTN: stable, continue coreg lotrel medicatonsHealth maintenance: exerise and diet  All labs, medications and tests reviewed, continue all other medications of all above medical condition listed as is.     [unfilled]

## 2020-10-05 ENCOUNTER — TELEPHONE (OUTPATIENT)
Dept: PHARMACY | Age: 55
End: 2020-10-05

## 2020-10-05 NOTE — TELEPHONE ENCOUNTER
No answer and no voicemail when called patient to schedule.      CLINICAL PHARMACY CONSULT: MED RECONCILIATION/REVIEW ADDENDUM    For Pharmacy Admin Tracking Only    PHSO: No  Total # of Interventions Recommended: 0    Total Interventions Accepted: 0  Time Spent (min): 5    Johnny Aviles, PauletteD

## 2020-10-12 ENCOUNTER — TELEPHONE (OUTPATIENT)
Dept: PHARMACY | Age: 55
End: 2020-10-12

## 2020-10-13 ENCOUNTER — TELEPHONE (OUTPATIENT)
Dept: PHARMACY | Age: 55
End: 2020-10-13

## 2020-10-13 NOTE — TELEPHONE ENCOUNTER
Called to schedule patient. No answer. No VM set up. Unable to leave message.     CLINICAL PHARMACY CONSULT: MED RECONCILIATION/REVIEW ADDENDUM    For Pharmacy Admin Tracking Only    PHSO: No  Total # of Interventions Recommended: 0  Total Interventions Accepted: 0  Time Spent (min): 5    Paulette HaleyD

## 2020-10-23 ENCOUNTER — TELEPHONE (OUTPATIENT)
Dept: PHARMACY | Age: 55
End: 2020-10-23

## 2020-10-23 NOTE — TELEPHONE ENCOUNTER
\"Wireless customer not available. \"    CLINICAL PHARMACY CONSULT: MED RECONCILIATION/REVIEW ADDENDUM    For Pharmacy Admin Tracking Only    PHSO: No  Total # of Interventions Recommended: 0  Total Interventions Accepted: 0  Time Spent (min): 5    Paulette ChrisD

## 2020-10-27 ENCOUNTER — TELEPHONE (OUTPATIENT)
Dept: PHARMACY | Age: 55
End: 2020-10-27

## 2020-10-27 NOTE — TELEPHONE ENCOUNTER
Phone not available.      CLINICAL PHARMACY CONSULT: MED RECONCILIATION/REVIEW ADDENDUM    For Pharmacy Admin Tracking Only    PHSO: No  Total # of Interventions Recommended: 0    Total Interventions Accepted: 0  Time Spent (min): 5    Catracho Metcalf, PharmD

## 2020-11-03 ENCOUNTER — TELEPHONE (OUTPATIENT)
Dept: PHARMACY | Age: 55
End: 2020-11-03

## 2020-11-03 NOTE — TELEPHONE ENCOUNTER
Phone not available. INR overdue.      CLINICAL PHARMACY CONSULT: MED RECONCILIATION/REVIEW ADDENDUM    For Pharmacy Admin Tracking Only    PHSO: No  Total # of Interventions Recommended: 0  Total Interventions Accepted: 0  Time Spent (min): 5    Paulette BenavidezD

## 2020-11-10 ENCOUNTER — TELEPHONE (OUTPATIENT)
Dept: PHARMACY | Age: 55
End: 2020-11-10

## 2020-11-10 NOTE — TELEPHONE ENCOUNTER
Phone number not available.      CLINICAL PHARMACY CONSULT: MED RECONCILIATION/REVIEW ADDENDUM    For Pharmacy Admin Tracking Only    PHSO: No  Total # of Interventions Recommended: 0    Total Interventions Accepted: 0  Time Spent (min): 5    Paulette CaponeD

## 2020-11-12 ENCOUNTER — TELEPHONE (OUTPATIENT)
Dept: PHARMACY | Age: 55
End: 2020-11-12

## 2020-11-12 NOTE — TELEPHONE ENCOUNTER
Patient's phone unavailable. Vidhya Hay and patient last filled warfarin August 18. Last INR was 9/14/20. Left voiecmail for Julia Marie at Dr. Bernadine Fontaine with above info.      Mailed letter to patient's home with scheduled appointment on 12/3 at 1:00pm.     CLINICAL PHARMACY CONSULT: [facility] Only    PHSO: No  Total # of Interventions Recommended: 0    Total Interventions Accepted: 0  Time Spent (min): 15    Jose Yan, PharmD

## 2020-12-04 ENCOUNTER — TELEPHONE (OUTPATIENT)
Dept: PHARMACY | Age: 55
End: 2020-12-04